# Patient Record
Sex: MALE | Race: WHITE | NOT HISPANIC OR LATINO | Employment: OTHER | URBAN - METROPOLITAN AREA
[De-identification: names, ages, dates, MRNs, and addresses within clinical notes are randomized per-mention and may not be internally consistent; named-entity substitution may affect disease eponyms.]

---

## 2018-01-30 ENCOUNTER — OFFICE VISIT (OUTPATIENT)
Dept: FAMILY MEDICINE | Facility: CLINIC | Age: 68
End: 2018-01-30
Payer: MEDICARE

## 2018-01-30 ENCOUNTER — LAB VISIT (OUTPATIENT)
Dept: LAB | Facility: HOSPITAL | Age: 68
End: 2018-01-30
Attending: INTERNAL MEDICINE
Payer: MEDICARE

## 2018-01-30 VITALS
TEMPERATURE: 98 F | HEART RATE: 96 BPM | SYSTOLIC BLOOD PRESSURE: 157 MMHG | BODY MASS INDEX: 33.61 KG/M2 | WEIGHT: 240.06 LBS | HEIGHT: 71 IN | DIASTOLIC BLOOD PRESSURE: 89 MMHG

## 2018-01-30 DIAGNOSIS — R00.0 TACHYCARDIA: ICD-10-CM

## 2018-01-30 DIAGNOSIS — Z12.11 SCREENING FOR COLORECTAL CANCER: ICD-10-CM

## 2018-01-30 DIAGNOSIS — Z11.59 NEED FOR HEPATITIS C SCREENING TEST: ICD-10-CM

## 2018-01-30 DIAGNOSIS — K46.9 ABDOMINAL HERNIA WITHOUT OBSTRUCTION AND WITHOUT GANGRENE, RECURRENCE NOT SPECIFIED, UNSPECIFIED HERNIA TYPE: ICD-10-CM

## 2018-01-30 DIAGNOSIS — I10 ESSENTIAL HYPERTENSION: ICD-10-CM

## 2018-01-30 DIAGNOSIS — E78.5 HYPERLIPIDEMIA, UNSPECIFIED HYPERLIPIDEMIA TYPE: ICD-10-CM

## 2018-01-30 DIAGNOSIS — Z12.12 SCREENING FOR COLORECTAL CANCER: ICD-10-CM

## 2018-01-30 DIAGNOSIS — Z00.00 ANNUAL PHYSICAL EXAM: Primary | ICD-10-CM

## 2018-01-30 LAB
ALBUMIN SERPL BCP-MCNC: 3.9 G/DL
ALP SERPL-CCNC: 113 U/L
ALT SERPL W/O P-5'-P-CCNC: 33 U/L
ANION GAP SERPL CALC-SCNC: 8 MMOL/L
AST SERPL-CCNC: 20 U/L
BASOPHILS # BLD AUTO: 0.03 K/UL
BASOPHILS NFR BLD: 0.4 %
BILIRUB SERPL-MCNC: 0.8 MG/DL
BUN SERPL-MCNC: 9 MG/DL
CALCIUM SERPL-MCNC: 9.7 MG/DL
CHLORIDE SERPL-SCNC: 102 MMOL/L
CHOLEST SERPL-MCNC: 238 MG/DL
CHOLEST/HDLC SERPL: 5.8 {RATIO}
CO2 SERPL-SCNC: 27 MMOL/L
CREAT SERPL-MCNC: 1 MG/DL
DIFFERENTIAL METHOD: ABNORMAL
EOSINOPHIL # BLD AUTO: 0.3 K/UL
EOSINOPHIL NFR BLD: 3.3 %
ERYTHROCYTE [DISTWIDTH] IN BLOOD BY AUTOMATED COUNT: 12.7 %
EST. GFR  (AFRICAN AMERICAN): >60 ML/MIN/1.73 M^2
EST. GFR  (NON AFRICAN AMERICAN): >60 ML/MIN/1.73 M^2
GLUCOSE SERPL-MCNC: 222 MG/DL
HCT VFR BLD AUTO: 43.3 %
HDLC SERPL-MCNC: 41 MG/DL
HDLC SERPL: 17.2 %
HGB BLD-MCNC: 15.3 G/DL
IMM GRANULOCYTES # BLD AUTO: 0.03 K/UL
IMM GRANULOCYTES NFR BLD AUTO: 0.4 %
LDLC SERPL CALC-MCNC: 151.8 MG/DL
LYMPHOCYTES # BLD AUTO: 1.9 K/UL
LYMPHOCYTES NFR BLD: 22.4 %
MCH RBC QN AUTO: 31.9 PG
MCHC RBC AUTO-ENTMCNC: 35.3 G/DL
MCV RBC AUTO: 90 FL
MONOCYTES # BLD AUTO: 0.7 K/UL
MONOCYTES NFR BLD: 8 %
NEUTROPHILS # BLD AUTO: 5.5 K/UL
NEUTROPHILS NFR BLD: 65.5 %
NONHDLC SERPL-MCNC: 197 MG/DL
NRBC BLD-RTO: 0 /100 WBC
PLATELET # BLD AUTO: 172 K/UL
PMV BLD AUTO: 10.1 FL
POTASSIUM SERPL-SCNC: 4.1 MMOL/L
PROT SERPL-MCNC: 7.9 G/DL
RBC # BLD AUTO: 4.8 M/UL
SODIUM SERPL-SCNC: 137 MMOL/L
TRIGL SERPL-MCNC: 226 MG/DL
WBC # BLD AUTO: 8.42 K/UL

## 2018-01-30 PROCEDURE — 36415 COLL VENOUS BLD VENIPUNCTURE: CPT | Mod: PO

## 2018-01-30 PROCEDURE — 93010 ELECTROCARDIOGRAM REPORT: CPT | Mod: ,,, | Performed by: INTERNAL MEDICINE

## 2018-01-30 PROCEDURE — G0438 PPPS, INITIAL VISIT: HCPCS | Mod: ,,, | Performed by: INTERNAL MEDICINE

## 2018-01-30 PROCEDURE — 86803 HEPATITIS C AB TEST: CPT

## 2018-01-30 PROCEDURE — 80061 LIPID PANEL: CPT

## 2018-01-30 PROCEDURE — 80053 COMPREHEN METABOLIC PANEL: CPT

## 2018-01-30 PROCEDURE — 93005 ELECTROCARDIOGRAM TRACING: CPT | Mod: PBBFAC,PO | Performed by: INTERNAL MEDICINE

## 2018-01-30 PROCEDURE — 99999 PR PBB SHADOW E&M-EST. PATIENT-LVL III: CPT | Mod: PBBFAC,,, | Performed by: INTERNAL MEDICINE

## 2018-01-30 PROCEDURE — 85025 COMPLETE CBC W/AUTO DIFF WBC: CPT

## 2018-01-30 PROCEDURE — 99213 OFFICE O/P EST LOW 20 MIN: CPT | Mod: PBBFAC,PO | Performed by: INTERNAL MEDICINE

## 2018-01-30 RX ORDER — LOSARTAN POTASSIUM AND HYDROCHLOROTHIAZIDE 12.5; 5 MG/1; MG/1
1 TABLET ORAL DAILY
Qty: 30 TABLET | Refills: 0 | Status: SHIPPED | OUTPATIENT
Start: 2018-01-30 | End: 2018-02-14 | Stop reason: DRUGHIGH

## 2018-01-30 NOTE — PATIENT INSTRUCTIONS
Controlling High Blood Pressure  High blood pressure (hypertension) is often called the silent killer. This is because many people who have it dont know it. High blood pressure is defined as 140/90 mm Hg or higher. Know your blood pressure and remember to check it regularly. Doing so can save your life. Here are some things you can do to help control your blood pressure.    Choose heart-healthy foods  · Select low-salt, low-fat foods. Limit sodium intake to 2,400 mg per day or the amount suggested by your healthcare provider.  · Limit canned, dried, cured, packaged, and fast foods. These can contain a lot of salt.  · Eat 8 to 10 servings of fruits and vegetables every day.  · Choose lean meats, fish, or chicken.  · Eat whole-grain pasta, brown rice, and beans.  · Eat 2 to 3 servings of low-fat or fat-free dairy products.  · Ask your doctor about the DASH eating plan. This plan helps reduce blood pressure.  · When you go to a restaurant, ask that your meal be prepared with no added salt.  Maintain a healthy weight  · Ask your healthcare provider how many calories to eat a day. Then stick to that number.  · Ask your healthcare provider what weight range is healthiest for you. If you are overweight, a weight loss of only 3% to 5% of your body weight can help lower blood pressure. Generally, a good weight loss goal is to lose 10% of your body weight in a year.  · Limit snacks and sweets.  · Get regular exercise.  Get up and get active  · Choose activities you enjoy. Find ones you can do with friends or family. This includes bicycling, dancing, walking, and jogging.  · Park farther away from building entrances.  · Use stairs instead of the elevator.  · When you can, walk or bike instead of driving.  · [place] leaves, garden, or do household repairs.  · Be active at a moderate to vigorous level of physical activity for at least 40 minutes for a minimum of 3 to 4 days a week.   Manage stress  · Make time to relax and enjoy  life. Find time to laugh.  · Communicate your concerns with your loved ones and your healthcare provider.  · Visit with family and friends, and keep up with hobbies.  Limit alcohol and quit smoking  · Men should have no more than 2 drinks per day.  · Women should have no more than 1 drink per day.  · Talk with your healthcare provider about quitting smoking. Smoking significantly increases your risk for heart disease and stroke. Ask your healthcare provider about community smoking cessation programs and other options.  Medicines  If lifestyle changes arent enough, your healthcare provider may prescribe high blood pressure medicine. Take all medicines as prescribed. If you have any questions about your medicines, ask your healthcare provider before stopping or changing them.   Date Last Reviewed: 4/27/2016  © 7633-5662 [employer]. [address] [place] [address], [place] [address]. All rights reserved. This information is not intended as a substitute for professional medical care. Always follow your healthcare professional's instructions.

## 2018-01-30 NOTE — PROGRESS NOTES
Subjective:        Patient ID: Edmundo Nagel is a 67 y.o. male.    Chief Complaint: Annual Exam    HPI   Edmundo Nagel presents for annual exam and to establish care.  Pt's last doctor's visit was ~2 years ago.  He has hx of HTN, HLD and was on medications previously.    Pt endorses high salt diet, no regular exercise.  He thinks he was on amlodipine in the past but doesn't think it really helped control his bp.  He has tried a lower salt diet and also reports that didn't seem to really help his bp.    Review of Systems   Constitutional: Negative for activity change and unexpected weight change.   HENT: Negative for ear pain and hearing loss.    Eyes: Positive for visual disturbance (due for eye exam, is planning to make appt).   Respiratory: Negative for chest tightness and shortness of breath.    Cardiovascular: Negative for chest pain.   Gastrointestinal: Negative for abdominal pain and blood in stool.        - large ventral hernia, no pain or discomfort   Genitourinary: Negative for difficulty urinating and hematuria.   Musculoskeletal: Negative for arthralgias and back pain.   Skin: Negative for rash.   Allergic/Immunologic: Negative for immunocompromised state.   Neurological: Negative for dizziness and headaches.   Hematological: Does not bruise/bleed easily.   Psychiatric/Behavioral: Negative for sleep disturbance.           Objective:        Vitals:    01/30/18 0959   BP: (!) 157/89   Pulse: 96   Temp: 98 °F (36.7 °C)     Physical Exam   Constitutional: He is oriented to person, place, and time. He appears well-developed and well-nourished. No distress.   HENT:   Head: Normocephalic and atraumatic.   Right Ear: External ear normal.   Left Ear: External ear normal.   Nose: Nose normal.   - bilateral ear canals clear, tympanic membranes visualized - normal color and light reflex   Eyes: Conjunctivae and EOM are normal.   Neck: Normal range of motion. Neck supple. No thyromegaly present.    Cardiovascular: Regular rhythm.    No murmur heard.  - tachycardic   Pulmonary/Chest: Effort normal and breath sounds normal. No respiratory distress.   Abdominal: Soft. He exhibits no distension. A hernia is present.   Musculoskeletal: He exhibits no edema.   Lymphadenopathy:     He has no cervical adenopathy.   Neurological: He is alert and oriented to person, place, and time.   Skin: Skin is warm and dry.   Vitals reviewed.          Assessment:         1. Annual physical exam    2. Essential hypertension    3. Tachycardia    4. Hyperlipidemia, unspecified hyperlipidemia type    5. Abdominal hernia without obstruction and without gangrene, recurrence not specified, unspecified hernia type    6. Screening for colorectal cancer    7. Need for hepatitis C screening test              Plan:         Edmundo was seen today for annual exam.    Diagnoses and all orders for this visit:    Annual physical exam  - discussed flu, pneumococcal vaccines; pt declines all at this time  - reports last tetanus 2014  - screening c-scope ordered  - fasting labs today    Essential hypertension: repeat bp still elevated; start losartan/hctz 50/12.5 qd.  Recommended low sodium diet, get regular physical activity that elevates HR.  - EKG NSR  -     CBC auto differential; Future  -     Comprehensive metabolic panel; Future  -     IN OFFICE EKG 12-LEAD (to Muse)  -     losartan-hydrochlorothiazide 50-12.5 mg (HYZAAR) 50-12.5 mg per tablet; Take 1 tablet by mouth once daily.    Tachycardia  -     IN OFFICE EKG 12-LEAD (to Muse)    Hyperlipidemia, unspecified hyperlipidemia type: Pt was on medication previously, will check fasting lipids today before restarting.  -     Lipid panel; Future    Abdominal hernia without obstruction and without gangrene, recurrence not specified, unspecified hernia type: Pt considering elective surgery due to the size and appearance of the hernia.  Will plan to refer to surgery once HTN and any other medical  issues addressed.    Screening for colorectal cancer  -     Case request GI: COLONOSCOPY    Need for hepatitis C screening test  -     Hepatitis C antibody; Future        Follow-up in about 2 weeks (around 2/13/2018) for high blood pressure, review lab results.    Patient Instructions       Controlling High Blood Pressure  High blood pressure (hypertension) is often called the silent killer. This is because many people who have it dont know it. High blood pressure is defined as 140/90 mm Hg or higher. Know your blood pressure and remember to check it regularly. Doing so can save your life. Here are some things you can do to help control your blood pressure.    Choose heart-healthy foods  · Select low-salt, low-fat foods. Limit sodium intake to 2,400 mg per day or the amount suggested by your healthcare provider.  · Limit canned, dried, cured, packaged, and fast foods. These can contain a lot of salt.  · Eat 8 to 10 servings of fruits and vegetables every day.  · Choose lean meats, fish, or chicken.  · Eat whole-grain pasta, brown rice, and beans.  · Eat 2 to 3 servings of low-fat or fat-free dairy products.  · Ask your doctor about the DASH eating plan. This plan helps reduce blood pressure.  · When you go to a restaurant, ask that your meal be prepared with no added salt.  Maintain a healthy weight  · Ask your healthcare provider how many calories to eat a day. Then stick to that number.  · Ask your healthcare provider what weight range is healthiest for you. If you are overweight, a weight loss of only 3% to 5% of your body weight can help lower blood pressure. Generally, a good weight loss goal is to lose 10% of your body weight in a year.  · Limit snacks and sweets.  · Get regular exercise.  Get up and get active  · Choose activities you enjoy. Find ones you can do with friends or family. This includes bicycling, dancing, walking, and jogging.  · Park farther away from building entrances.  · Use stairs instead  of the elevator.  · When you can, walk or bike instead of driving.  · [place] leaves, garden, or do household repairs.  · Be active at a moderate to vigorous level of physical activity for at least 40 minutes for a minimum of 3 to 4 days a week.   Manage stress  · Make time to relax and enjoy life. Find time to laugh.  · Communicate your concerns with your loved ones and your healthcare provider.  · Visit with family and friends, and keep up with hobbies.  Limit alcohol and quit smoking  · Men should have no more than 2 drinks per day.  · Women should have no more than 1 drink per day.  · Talk with your healthcare provider about quitting smoking. Smoking significantly increases your risk for heart disease and stroke. Ask your healthcare provider about community smoking cessation programs and other options.  Medicines  If lifestyle changes arent enough, your healthcare provider may prescribe high blood pressure medicine. Take all medicines as prescribed. If you have any questions about your medicines, ask your healthcare provider before stopping or changing them.   Date Last Reviewed: 4/27/2016 © 2000-2017 [employer]. [address] [place] [address], [place] [address]. All rights reserved. This information is not intended as a substitute for professional medical care. Always follow your healthcare professional's instructions.

## 2018-01-31 ENCOUNTER — PATIENT MESSAGE (OUTPATIENT)
Dept: FAMILY MEDICINE | Facility: CLINIC | Age: 68
End: 2018-01-31

## 2018-01-31 DIAGNOSIS — E78.5 HYPERLIPIDEMIA, UNSPECIFIED HYPERLIPIDEMIA TYPE: Primary | ICD-10-CM

## 2018-01-31 DIAGNOSIS — R73.09 ELEVATED GLUCOSE: ICD-10-CM

## 2018-01-31 LAB — HCV AB SERPL QL IA: NEGATIVE

## 2018-01-31 RX ORDER — ATORVASTATIN CALCIUM 40 MG/1
40 TABLET, FILM COATED ORAL DAILY
Qty: 30 TABLET | Refills: 11 | Status: SHIPPED | OUTPATIENT
Start: 2018-01-31 | End: 2019-02-14 | Stop reason: SDUPTHER

## 2018-02-02 DIAGNOSIS — Z12.11 SPECIAL SCREENING FOR MALIGNANT NEOPLASMS, COLON: Primary | ICD-10-CM

## 2018-02-02 RX ORDER — POLYETHYLENE GLYCOL 3350, SODIUM SULFATE ANHYDROUS, SODIUM BICARBONATE, SODIUM CHLORIDE, POTASSIUM CHLORIDE 236; 22.74; 6.74; 5.86; 2.97 G/4L; G/4L; G/4L; G/4L; G/4L
4 POWDER, FOR SOLUTION ORAL ONCE
Qty: 4000 ML | Refills: 0 | Status: SHIPPED | OUTPATIENT
Start: 2018-02-02 | End: 2018-02-02

## 2018-02-08 ENCOUNTER — TELEPHONE (OUTPATIENT)
Dept: FAMILY MEDICINE | Facility: CLINIC | Age: 68
End: 2018-02-08

## 2018-02-09 ENCOUNTER — LAB VISIT (OUTPATIENT)
Dept: LAB | Facility: HOSPITAL | Age: 68
End: 2018-02-09
Attending: INTERNAL MEDICINE
Payer: MEDICARE

## 2018-02-09 DIAGNOSIS — R73.09 ELEVATED GLUCOSE: ICD-10-CM

## 2018-02-09 LAB
ESTIMATED AVG GLUCOSE: 212 MG/DL
HBA1C MFR BLD HPLC: 9 %

## 2018-02-09 PROCEDURE — 83036 HEMOGLOBIN GLYCOSYLATED A1C: CPT

## 2018-02-09 PROCEDURE — 36415 COLL VENOUS BLD VENIPUNCTURE: CPT | Mod: PO

## 2018-02-14 ENCOUNTER — OFFICE VISIT (OUTPATIENT)
Dept: FAMILY MEDICINE | Facility: CLINIC | Age: 68
End: 2018-02-14
Payer: MEDICARE

## 2018-02-14 VITALS
HEART RATE: 108 BPM | HEIGHT: 71 IN | RESPIRATION RATE: 16 BRPM | SYSTOLIC BLOOD PRESSURE: 144 MMHG | DIASTOLIC BLOOD PRESSURE: 86 MMHG | WEIGHT: 236.13 LBS | BODY MASS INDEX: 33.06 KG/M2 | TEMPERATURE: 99 F

## 2018-02-14 DIAGNOSIS — E78.5 HYPERLIPIDEMIA, UNSPECIFIED HYPERLIPIDEMIA TYPE: ICD-10-CM

## 2018-02-14 DIAGNOSIS — E11.9 TYPE 2 DIABETES MELLITUS WITHOUT COMPLICATION, WITHOUT LONG-TERM CURRENT USE OF INSULIN: Primary | ICD-10-CM

## 2018-02-14 DIAGNOSIS — I10 ESSENTIAL HYPERTENSION: ICD-10-CM

## 2018-02-14 LAB
CREAT UR-MCNC: 158 MG/DL
MICROALBUMIN UR DL<=1MG/L-MCNC: 48 UG/ML
MICROALBUMIN/CREATININE RATIO: 30.4 UG/MG

## 2018-02-14 PROCEDURE — 99214 OFFICE O/P EST MOD 30 MIN: CPT | Mod: S$PBB,,, | Performed by: INTERNAL MEDICINE

## 2018-02-14 PROCEDURE — 82043 UR ALBUMIN QUANTITATIVE: CPT

## 2018-02-14 PROCEDURE — 1126F AMNT PAIN NOTED NONE PRSNT: CPT | Mod: ,,, | Performed by: INTERNAL MEDICINE

## 2018-02-14 PROCEDURE — 99999 PR PBB SHADOW E&M-EST. PATIENT-LVL IV: CPT | Mod: PBBFAC,,, | Performed by: INTERNAL MEDICINE

## 2018-02-14 PROCEDURE — 1159F MED LIST DOCD IN RCRD: CPT | Mod: ,,, | Performed by: INTERNAL MEDICINE

## 2018-02-14 PROCEDURE — 99214 OFFICE O/P EST MOD 30 MIN: CPT | Mod: PBBFAC,PO | Performed by: INTERNAL MEDICINE

## 2018-02-14 RX ORDER — INSULIN PUMP SYRINGE, 3 ML
EACH MISCELLANEOUS
Qty: 1 EACH | Refills: 0 | Status: SHIPPED | OUTPATIENT
Start: 2018-02-14

## 2018-02-14 RX ORDER — METFORMIN HYDROCHLORIDE 500 MG/1
TABLET, EXTENDED RELEASE ORAL
Qty: 60 TABLET | Refills: 3 | Status: SHIPPED | OUTPATIENT
Start: 2018-02-14 | End: 2018-05-16 | Stop reason: SDUPTHER

## 2018-02-14 RX ORDER — LANCETS
EACH MISCELLANEOUS
Qty: 100 EACH | Refills: 3 | Status: SHIPPED | OUTPATIENT
Start: 2018-02-14

## 2018-02-14 RX ORDER — LOSARTAN POTASSIUM AND HYDROCHLOROTHIAZIDE 25; 100 MG/1; MG/1
1 TABLET ORAL DAILY
Qty: 90 TABLET | Refills: 3 | Status: SHIPPED | OUTPATIENT
Start: 2018-02-14 | End: 2019-02-27 | Stop reason: SDUPTHER

## 2018-02-14 NOTE — PROGRESS NOTES
"Subjective:        Patient ID: Edmundo Nagel is a 67 y.o. male.    Chief Complaint: Follow-up (2 week follow up)    [facility]   Edmundo Nagel presents for follow up of HTN and review of recent lab work.    Pt has been taking Losartan/HCTZ x 2 weeks w/o adverse effects.  Started Atorvastatin a few days ago.    Pt lives with his mother and she cooks their meals 3x/day.  Pt endorses eating dessert everyday, hard candy sometimes.  He does eat a lot of bread.  No regular exercise.    Review of Systems  as per [facility]      Objective:        Vitals:    02/14/18 1109   BP: (!) 144/86   Pulse:    Resp:    Temp:      Physical Exam   Constitutional: He is oriented to person, place, and time. He appears well-developed and well-nourished. No distress.   HENT:   Head: Normocephalic and atraumatic.   Cardiovascular: Normal rate, regular rhythm and intact distal pulses.    Pulmonary/Chest: Effort normal. No respiratory distress.   Neurological: He is alert and oriented to person, place, and time.   Skin: Skin is warm and dry.   Vitals reviewed.      Most recent lab results reviewed with patient.    Assessment:         1. Type 2 diabetes mellitus without complication, without long-term current use of insulin    2. Essential hypertension    3. Hyperlipidemia, unspecified hyperlipidemia type              Plan:         Edmundo was seen today for follow-up.    Diagnoses and all orders for this visit:    Type 2 diabetes mellitus without complication, without long-term current use of insulin: New dx.  Pt had been told in the past his blood sugar was a little elevated but they were going to "just watch it" and he was never prescribed medication or diagnosed with DM.  - Discussed diet modifications  - Refer to DM education to review diet, glucometer teaching  - Rx for supplies, Metformin 500mg BID send to pharmacy.  Start once daily then increase to BID after 1 week.  Discussed common SEs.  - microalbumin today  - Recheck A1c in 3 months, " foot exam at that time.  -     metFORMIN (GLUCOPHAGE-XR) 500 MG 24 hr tablet; Take 1 tab by mouth with breakfast for 1 week then increase to twice daily with breakfast and dinner.  -     Hemoglobin A1c; Future  -     Microalbumin/creatinine urine ratio  -     Ambulatory Referral to Diabetes Education  -     blood-glucose meter kit; To check BG 1 times daily, to use with insurance preferred meter  -     lancets Misc; To check BG 1 times daily, to use with insurance preferred meter  -     blood sugar diagnostic Strp; To check BG 1 times daily, to use with insurance preferred meter    Essential hypertension: Improved, increase Losartan/HCTZ from 50/12.5 to 100/25mg qd.  -     losartan-hydrochlorothiazide 100-25 mg (HYZAAR) 100-25 mg per tablet; Take 1 tablet by mouth once daily.    Hyperlipidemia, unspecified hyperlipidemia type: Pt just started Atorvastatin 40 qd; recheck fasting lipids in 3 months.  -     Lipid panel; Future        Follow-up in about 3 months (around 5/14/2018) for Diabetes (get fasting labs 1 week before).

## 2018-02-15 ENCOUNTER — TELEPHONE (OUTPATIENT)
Dept: ENDOSCOPY | Facility: HOSPITAL | Age: 68
End: 2018-02-15

## 2018-02-15 DIAGNOSIS — Z12.11 SPECIAL SCREENING FOR MALIGNANT NEOPLASMS, COLON: Primary | ICD-10-CM

## 2018-02-15 PROBLEM — E11.29 TYPE 2 DIABETES MELLITUS WITH MICROALBUMINURIA, WITHOUT LONG-TERM CURRENT USE OF INSULIN: Status: ACTIVE | Noted: 2018-02-14

## 2018-02-15 PROBLEM — R80.9 TYPE 2 DIABETES MELLITUS WITH MICROALBUMINURIA, WITHOUT LONG-TERM CURRENT USE OF INSULIN: Status: ACTIVE | Noted: 2018-02-14

## 2018-02-15 RX ORDER — POLYETHYLENE GLYCOL 3350, SODIUM SULFATE ANHYDROUS, SODIUM BICARBONATE, SODIUM CHLORIDE, POTASSIUM CHLORIDE 236; 22.74; 6.74; 5.86; 2.97 G/4L; G/4L; G/4L; G/4L; G/4L
4 POWDER, FOR SOLUTION ORAL ONCE
Qty: 4000 ML | Refills: 0 | Status: SHIPPED | OUTPATIENT
Start: 2018-02-15 | End: 2018-02-16

## 2018-02-16 ENCOUNTER — ANESTHESIA EVENT (OUTPATIENT)
Dept: ENDOSCOPY | Facility: HOSPITAL | Age: 68
End: 2018-02-16
Payer: MEDICARE

## 2018-02-16 ENCOUNTER — ANESTHESIA (OUTPATIENT)
Dept: ENDOSCOPY | Facility: HOSPITAL | Age: 68
End: 2018-02-16
Payer: MEDICARE

## 2018-02-16 ENCOUNTER — TELEPHONE (OUTPATIENT)
Dept: FAMILY MEDICINE | Facility: CLINIC | Age: 68
End: 2018-02-16

## 2018-02-16 ENCOUNTER — HOSPITAL ENCOUNTER (OUTPATIENT)
Facility: HOSPITAL | Age: 68
Discharge: HOME | End: 2018-02-16
Attending: COLON & RECTAL SURGERY | Admitting: COLON & RECTAL SURGERY
Payer: MEDICARE

## 2018-02-16 VITALS
RESPIRATION RATE: 16 BRPM | OXYGEN SATURATION: 96 % | HEIGHT: 71 IN | WEIGHT: 230 LBS | SYSTOLIC BLOOD PRESSURE: 145 MMHG | DIASTOLIC BLOOD PRESSURE: 75 MMHG | TEMPERATURE: 98 F | BODY MASS INDEX: 32.2 KG/M2 | HEART RATE: 93 BPM

## 2018-02-16 DIAGNOSIS — Z12.11 SCREENING FOR COLON CANCER: ICD-10-CM

## 2018-02-16 LAB
GLUCOSE SERPL-MCNC: 236 MG/DL (ref 70–110)
POCT GLUCOSE: 236 MG/DL (ref 70–110)

## 2018-02-16 PROCEDURE — 63600175 PHARM REV CODE 636 W HCPCS: Performed by: NURSE ANESTHETIST, CERTIFIED REGISTERED

## 2018-02-16 PROCEDURE — 37000008 HC ANESTHESIA 1ST 15 MINUTES: Performed by: COLON & RECTAL SURGERY

## 2018-02-16 PROCEDURE — 88305 TISSUE EXAM BY PATHOLOGIST: CPT | Mod: 26,,, | Performed by: PATHOLOGY

## 2018-02-16 PROCEDURE — 88305 TISSUE EXAM BY PATHOLOGIST: CPT | Performed by: PATHOLOGY

## 2018-02-16 PROCEDURE — D9220A PRA ANESTHESIA: Mod: PT,CRNA,, | Performed by: NURSE ANESTHETIST, CERTIFIED REGISTERED

## 2018-02-16 PROCEDURE — 37000009 HC ANESTHESIA EA ADD 15 MINS: Performed by: COLON & RECTAL SURGERY

## 2018-02-16 PROCEDURE — 27201012 HC FORCEPS, HOT/COLD, DISP: Performed by: COLON & RECTAL SURGERY

## 2018-02-16 PROCEDURE — 82962 GLUCOSE BLOOD TEST: CPT | Performed by: COLON & RECTAL SURGERY

## 2018-02-16 PROCEDURE — 45380 COLONOSCOPY AND BIOPSY: CPT | Mod: PT,,, | Performed by: COLON & RECTAL SURGERY

## 2018-02-16 PROCEDURE — D9220A PRA ANESTHESIA: Mod: PT,ANES,, | Performed by: ANESTHESIOLOGY

## 2018-02-16 PROCEDURE — 45380 COLONOSCOPY AND BIOPSY: CPT | Performed by: COLON & RECTAL SURGERY

## 2018-02-16 PROCEDURE — 25000003 PHARM REV CODE 250: Performed by: NURSE PRACTITIONER

## 2018-02-16 RX ORDER — LIDOCAINE HCL/PF 100 MG/5ML
SYRINGE (ML) INTRAVENOUS
Status: DISCONTINUED | OUTPATIENT
Start: 2018-02-16 | End: 2018-02-16

## 2018-02-16 RX ORDER — PROPOFOL 10 MG/ML
VIAL (ML) INTRAVENOUS CONTINUOUS PRN
Status: DISCONTINUED | OUTPATIENT
Start: 2018-02-16 | End: 2018-02-16

## 2018-02-16 RX ORDER — PROPOFOL 10 MG/ML
VIAL (ML) INTRAVENOUS
Status: DISCONTINUED | OUTPATIENT
Start: 2018-02-16 | End: 2018-02-16

## 2018-02-16 RX ORDER — SODIUM CHLORIDE 9 MG/ML
INJECTION, SOLUTION INTRAVENOUS CONTINUOUS
Status: DISCONTINUED | OUTPATIENT
Start: 2018-02-16 | End: 2018-02-16 | Stop reason: HOSPADM

## 2018-02-16 RX ADMIN — PROPOFOL 150 MCG/KG/MIN: 10 INJECTION, EMULSION INTRAVENOUS at 11:02

## 2018-02-16 RX ADMIN — PROPOFOL 80 MG: 10 INJECTION, EMULSION INTRAVENOUS at 11:02

## 2018-02-16 RX ADMIN — LIDOCAINE HYDROCHLORIDE 75 MG: 20 INJECTION, SOLUTION INTRAVENOUS at 11:02

## 2018-02-16 RX ADMIN — SODIUM CHLORIDE: 0.9 INJECTION, SOLUTION INTRAVENOUS at 10:02

## 2018-02-16 NOTE — H&P
Endoscopy H&P    Procedure : Colonoscopy      asymptomatic screening exam      Past Medical History:   Diagnosis Date    Abdominal hernia without obstruction and without gangrene 1/30/2018    DM (diabetes mellitus)     Essential hypertension 1/30/2018    Hyperlipidemia 1/30/2018       Family History   Problem Relation Age of Onset    Cancer Father      lung       Social History     Social History    Marital status: Single     Spouse name: N/A    Number of children: N/A    Years of education: N/A     Occupational History    Not on file.     Social History Main Topics    Smoking status: Former Smoker    Smokeless tobacco: Never Used      Comment: quit 1978    Alcohol use No      Comment: quit 2006    Drug use: No    Sexual activity: Not on file     Other Topics Concern    Not on file     Social History Narrative    Retired     Lives with mother; 2 children    No regular exercise       Review of Systems:  Respiratory ROS: no cough, shortness of breath, or wheezing  Cardiovascular ROS: no chest pain or dyspnea on exertion  Gastrointestinal ROS: no abdominal pain, change in bowel habits, or black or bloody stools  Musculoskeletal ROS: negative  Neurological ROS: no TIA or stroke symptoms        Physical Exam:  General: no distress  Head: normocephalic  Neck: supple, symmetrical, trachea midline  Lungs:  clear to auscultation bilaterally and normal respiratory effort  Heart: regular rate and rhythm, S1, S2 normal, no murmur, rub or gallop  Abdomen: soft, non-tender non-distented; bowel sounds normal; no masses,  no organomegaly  Extremities: no cyanosis or edema, or clubbing       Deep Sedation: Mallampati Score II (hard and soft palate, upper portion of tonsils anduvula visible)    II    Assessment and Plan:  Proceed with Colonoscopy

## 2018-02-16 NOTE — ANESTHESIA POSTPROCEDURE EVALUATION
"Anesthesia Post Evaluation    Patient: Edmundo Nagel    Procedure(s) Performed: Procedure(s) (LRB):  COLONOSCOPY (N/A)    Final Anesthesia Type: general  Patient location during evaluation: PACU  Patient participation: Yes- Able to Participate  Level of consciousness: awake and alert  Post-procedure vital signs: reviewed and stable  Pain management: adequate  Airway patency: patent  PONV status at discharge: No PONV  Anesthetic complications: no      Cardiovascular status: blood pressure returned to baseline  Respiratory status: unassisted  Hydration status: euvolemic  Follow-up not needed.        Visit Vitals  BP (!) 145/75 (BP Location: Left arm, Patient Position: Lying)   Pulse 93   Temp 36.9 °C (98.4 °F)   Resp 16   Ht 5' 11" (1.803 m)   Wt 104.3 kg (230 lb)   SpO2 96%   BMI 32.08 kg/m²       Pain/Angel Score: Pain Assessment Performed: Yes (2/16/2018 12:24 PM)  Presence of Pain: denies (2/16/2018 12:24 PM)  Angel Score: 9 (2/16/2018 11:58 AM)      "

## 2018-02-16 NOTE — ANESTHESIA PREPROCEDURE EVALUATION
02/16/2018  Edmundo Nagel is a 67 y.o., male.  Past Medical History:   Diagnosis Date    Abdominal hernia without obstruction and without gangrene 1/30/2018    DM (diabetes mellitus)     Essential hypertension 1/30/2018    Hyperlipidemia 1/30/2018     Patient Active Problem List   Diagnosis    Essential hypertension    Hyperlipidemia    Abdominal hernia without obstruction and without gangrene    Type 2 diabetes mellitus with microalbuminuria, without long-term current use of insulin    Screening for colon cancer     Review of patient's allergies indicates:  No Known Allergies    Anesthesia Evaluation    I have reviewed the Patient Summary Reports.    I have reviewed the Nursing Notes.   I have reviewed the Medications.     Review of Systems  Cardiovascular:   Hypertension    Hepatic/GI:   Denies GERD.    Neurological:  Neurology Normal Denies TIA.  Denies CVA. Denies Seizures.    Endocrine:   Diabetes Denies Hypothyroidism. Denies Hyperthyroidism.        Physical Exam  General:  Obesity    Airway/Jaw/Neck:  Airway Findings: Mouth Opening: Small, but > 3cm Tongue: Normal  General Airway Assessment: Adult  Mallampati: IV  TM Distance: < 4 cm  Jaw/Neck Findings:  Neck ROM: Normal ROM      Dental:  Dental Findings: Periodontal disease, Mild        Mental Status:  Mental Status Findings:  Cooperative         Anesthesia Plan  Type of Anesthesia, risks & benefits discussed:  Anesthesia Type:  general  Patient's Preference: ga  Intra-op Monitoring Plan: standard ASA monitors  Intra-op Monitoring Plan Comments:   Post Op Pain Control Plan: per primary service following discharge from PACU  Post Op Pain Control Plan Comments:   Induction:   IV  Beta Blocker:  Patient is not currently on a Beta-Blocker (No further documentation required).       Informed Consent: Patient understands risks and agrees with  Anesthesia plan.  Questions answered. Anesthesia consent signed with patient.  ASA Score: 2     Day of Surgery Review of History & Physical:    H&P update referred to the provider.         Ready For Surgery From Anesthesia Perspective.

## 2018-02-16 NOTE — [employer] PATIENT INSTRUCTIONS
Discharge Summary/Instructions after an Endoscopic Procedure  Patient Name: Edmundo Nagel  Patient MRN: [id number]  Patient YOB: 1950 Friday, February 16, 2018  Kentrell Telles MD  RESTRICTIONS:  During your procedure today, you received medications for sedation.  These   medications may affect your judgment, balance and coordination.  Therefore,   for 24 hours, you have the following restrictions:   - DO NOT drive a car, operate machinery, make legal/financial decisions,   sign important papers or drink alcohol.    ACTIVITY:  The following day: return to full activity including work, except no heavy   lifting, straining or running for 3 days if polyps were removed.  DIET:  Eat and drink normally unless instructed otherwise.     TREATMENT FOR COMMON SIDE EFFECTS:  - Mild abdominal pain, belching, bloating or excessive gas: rest, eat   lightly and use a heating pad.  - Sore Throat: treat with throat lozenges and/or gargle with warm salt   water.  SYMPTOMS TO WATCH FOR AND REPORT TO YOUR PHYSICIAN:  1. Abdominal pain or bloating, other than gas cramps.  2. Chest pain.  3. Back pain.  4. Chills or fever occurring within 24 hours after the procedure.  5. Rectal bleeding, which would show as bright red, maroon, or black stools.   (A tablespoon of blood from the rectum is not serious, especially if   hemorrhoids are present.)  6. Vomiting.  7. Weakness or dizziness.  8. Because air was used during the procedure, expelling large amounts of air   from your rectum or belching is normal.  9. If a bowel prep was taken, you may not have a bowel movement for 1-3   days.  This is normal.  GO DIRECTLY TO THE NEAREST EMERGENCY ROOM IF YOU HAVE ANY OF THE FOLLOWING:      Difficulty breathing  Chills and/or fever over 101 F   Persistent vomiting and/or vomiting blood   Severe abdominal pain   Severe chest pain   Black, tarry stools   Bleeding- more than one tablespoon   Any other symptom or condition that you may feel  needs urgent attention  Your doctor recommends these additional instructions:  If any biopsies were taken, your doctor s clinic will contact you in 1 to 2   weeks with any results.  Your physician has recommended a repeat colonoscopy in five years for   surveillance.  For questions, problems or results please call your physician - Kentrell Telles MD at Work:  [phone].  [facility], EMERGENCY ROOM PHONE NUMBER: [phone]  IF A COMPLICATION OR EMERGENCY SITUATION ARISES AND YOU ARE UNABLE TO REACH   YOUR PHYSICIAN - GO DIRECTLY TO THE EMERGENCY ROOM.  Kentrell Telles MD  2/16/2018 12:01:56 PM  This report has been verified and signed electronically.

## 2018-02-16 NOTE — TRANSFER OF CARE
"Anesthesia Transfer of Care Note    Patient: Edmundo Nagel    Procedure(s) Performed: Procedure(s) (LRB):  COLONOSCOPY (N/A)    Patient location: PACU    Anesthesia Type: general    Transport from OR: Transported from OR on room air with adequate spontaneous ventilation    Post pain: adequate analgesia    Post assessment: no apparent anesthetic complications and tolerated procedure well    Post vital signs: stable    Level of consciousness: awake and oriented    Nausea/Vomiting: no nausea/vomiting    Complications: none    Transfer of care protocol was followed      Last vitals:   Visit Vitals  /71   Pulse 71   Temp 36.9 °C (98.4 °F)   Resp 16   Ht 5' 11" (1.803 m)   Wt 104.3 kg (230 lb)   SpO2 (!) 94%   BMI 32.08 kg/m²     "

## 2018-02-23 ENCOUNTER — TELEPHONE (OUTPATIENT)
Dept: ENDOSCOPY | Facility: HOSPITAL | Age: 68
End: 2018-02-23

## 2018-02-27 ENCOUNTER — PATIENT OUTREACH (OUTPATIENT)
Dept: DIABETES | Facility: CLINIC | Age: 68
End: 2018-02-27

## 2018-02-27 NOTE — PROGRESS NOTES
Patient missed diabetes education appointment today - states he thought it was for tomorrow. Rescheduled for 3/6/18. Appointment slip mailed.

## 2018-03-06 ENCOUNTER — PATIENT MESSAGE (OUTPATIENT)
Dept: DIABETES | Facility: CLINIC | Age: 68
End: 2018-03-06

## 2018-03-06 ENCOUNTER — PATIENT OUTREACH (OUTPATIENT)
Dept: DIABETES | Facility: CLINIC | Age: 68
End: 2018-03-06

## 2018-03-06 NOTE — PROGRESS NOTES
Patient missed diabetes education appointment today. This is patient's 2nd no show in 2 weeks. Message sent through portal to contact clinic if he would like to reschedule.

## 2018-05-05 ENCOUNTER — PATIENT MESSAGE (OUTPATIENT)
Dept: DIABETES | Facility: CLINIC | Age: 68
End: 2018-05-05

## 2018-05-08 ENCOUNTER — CLINICAL SUPPORT (OUTPATIENT)
Dept: DIABETES | Facility: CLINIC | Age: 68
End: 2018-05-08
Payer: MEDICARE

## 2018-05-08 ENCOUNTER — LAB VISIT (OUTPATIENT)
Dept: LAB | Facility: HOSPITAL | Age: 68
End: 2018-05-08
Attending: INTERNAL MEDICINE
Payer: MEDICARE

## 2018-05-08 DIAGNOSIS — E11.9 TYPE 2 DIABETES MELLITUS WITHOUT COMPLICATION, WITHOUT LONG-TERM CURRENT USE OF INSULIN: ICD-10-CM

## 2018-05-08 DIAGNOSIS — E78.5 HYPERLIPIDEMIA, UNSPECIFIED HYPERLIPIDEMIA TYPE: ICD-10-CM

## 2018-05-08 LAB
CHOLEST SERPL-MCNC: 136 MG/DL
CHOLEST/HDLC SERPL: 3.6 {RATIO}
ESTIMATED AVG GLUCOSE: 192 MG/DL
HBA1C MFR BLD HPLC: 8.3 %
HDLC SERPL-MCNC: 38 MG/DL
HDLC SERPL: 27.9 %
LDLC SERPL CALC-MCNC: 75.6 MG/DL
NONHDLC SERPL-MCNC: 98 MG/DL
TRIGL SERPL-MCNC: 112 MG/DL

## 2018-05-08 PROCEDURE — 99211 OFF/OP EST MAY X REQ PHY/QHP: CPT | Mod: PBBFAC,PO

## 2018-05-08 PROCEDURE — 99999 PR PBB SHADOW E&M-EST. PATIENT-LVL I: CPT | Mod: PBBFAC,,,

## 2018-05-08 PROCEDURE — G0108 DIAB MANAGE TRN  PER INDIV: HCPCS | Mod: PBBFAC,PO | Performed by: DIETITIAN, REGISTERED

## 2018-05-08 PROCEDURE — 83036 HEMOGLOBIN GLYCOSYLATED A1C: CPT

## 2018-05-08 PROCEDURE — 36415 COLL VENOUS BLD VENIPUNCTURE: CPT | Mod: PO

## 2018-05-08 PROCEDURE — 80061 LIPID PANEL: CPT

## 2018-05-08 NOTE — PROGRESS NOTES
Diabetes Education  Author: Allyson Juarez RD, CDE  Date: 5/8/2018    Diabetes Education Visit  Diabetes Education Record Assessment/Progress: Initial    Diabetes Type  Diabetes Type : Type II    Diabetes History  Diabetes Diagnosis: 0-1 year    Nutrition  Meal Planning: 3 meals per day, snacks between meal, drinks regular soda, eats out seldom (Sometimes may have a snack)  What type of sweetener do you use?: sugar  What type of beverages do you drink?: regular soda/tea, other (see comments), milk (Coffee w/ sugar)    Monitoring   Monitoring:  (None  - Rx for meter and testing supplies sent to pharmacy back in Feb; patient never picked up)  Blood Glucose Logs: No  Do you use a personal glucose monitor?: No  In the last month, how often have you had a low blood sugar reaction?: never  What are your symptoms of low blood sugar?:  (None)  How do you treat low blood sugar?:  (None)  Can you tell when your blood sugar is too high?: no  How do you treat high blood sugar?:  (None)    Exercise   Exercise Type: none    Current Diabetes Treatment   Current Treatment: Oral Medication (Metformin BID)    Social History  Preferred Learning Method: Face to Face  Primary Support: Self  Smoking Status: Ex Smoker  Alcohol Use: Never    DDS-2 Score  ( > 3 = SIGNIFICANT DISTRESS): 1    Barriers to Change  Barriers to Change: None  Learning Challenges : None    Readiness to Learn   Readiness to Learn : Acceptance    Cultural Influences  Cultural Influences: No    Diabetes Education Assessment/Progress  Diabetes Disease Process (diabetes disease process and treatment options): Instructed, Discussion, Individual Session, Written Materials Provided  Nutrition (Incorporating nutritional management into one's lifestyle): Instructed, Discussion, Individual Session, Written Materials Provided (Discussed general nutrition guidelines and plate method; encouraged to eliminate sugary beverages)  Physical Activity (incorporating physical activity  into one's lifestyle): Instructed, Discussion, Individual Session, Written Materials Provided (Discussed goals and benefits)  Medications (states correct name, dose, onset, peak, duration, side effects & timing of meds): Instructed, Discussion, Individual Session, Written Materials Provided (Reviewed medication regimen)  Monitoring (monitoring blood glucose/other parameters & using results): Instructed, Discussion, Individual Session, Written Materials Provided (Reviewed SMBG schedule and BG goals)  Acute Complications (preventing, detecting, and treating acute complications): Instructed, Discussion, Individual Session, Written Materials Provided (Reviewed s/s and treatment of hypoglycemia)  Chronic Complications (preventing, detecting, and treating chronic complications): Instructed, Discussion, Individual Session, Written Materials Provided (Reviewed standards of care; scheduled for eye exam next week)  Clinical (diabetes, other pertinent medical history, and relevant comorbidities reviewed during visit): Instructed, Discussion, Individual Session  Cognitive (knowledge of self-management skills, functional health literacy): Instructed, Discussion, Individual Session  Psychosocial (emotional response to diabetes): Instructed, Discussion, Individual Session  Diabetes Distress and Support Systems: Instructed, Discussion, Individual Session  Behavioral (readiness for change, lifestyle practices, self-care behaviors): Instructed, Discussion, Individual Session    Goals  Patient has selected/evaluated goals during today's session: Yes, selected  Healthy Eating: Set (Eliminate sugary beverages)    Diabetes Care Plan/Intervention  Education Plan/Intervention: Individual Follow-Up DSMT    Diabetes Meal Plan  Restrictions: Restricted Carbohydrate    Education Units of Time   Time Spent: 45 min    Health Maintenance was reviewed today with patient. Discussed with patient importance of routine eye exams, foot exams/foot care,  blood work (i.e.: A1c, microalbumin, and lipid), dental visits, yearly flu vaccine, and pneumonia vaccine as indicated by PCP. Patient verbalized understanding.     Health Maintenance Topics with due status: Not Due       Topic Last Completion Date    Influenza Vaccine 01/30/2018    Lipid Panel 01/30/2018    Hemoglobin A1c 02/09/2018    Low Dose Statin 02/16/2018    Colonoscopy 02/16/2018     Health Maintenance Due   Topic Date Due    Foot Exam  12/21/1961    Eye Exam  12/21/1961    TETANUS VACCINE  12/21/1969    Zoster Vaccine  12/21/2011    Abdominal Aortic Aneurysm Screening  12/21/2016

## 2018-05-15 ENCOUNTER — TELEPHONE (OUTPATIENT)
Dept: FAMILY MEDICINE | Facility: CLINIC | Age: 68
End: 2018-05-15

## 2018-05-16 ENCOUNTER — OFFICE VISIT (OUTPATIENT)
Dept: FAMILY MEDICINE | Facility: CLINIC | Age: 68
End: 2018-05-16
Payer: MEDICARE

## 2018-05-16 ENCOUNTER — OFFICE VISIT (OUTPATIENT)
Dept: OPTOMETRY | Facility: CLINIC | Age: 68
End: 2018-05-16
Payer: MEDICARE

## 2018-05-16 ENCOUNTER — TELEPHONE (OUTPATIENT)
Dept: FAMILY MEDICINE | Facility: CLINIC | Age: 68
End: 2018-05-16

## 2018-05-16 VITALS
BODY MASS INDEX: 33.34 KG/M2 | TEMPERATURE: 98 F | HEIGHT: 71 IN | WEIGHT: 238.13 LBS | SYSTOLIC BLOOD PRESSURE: 158 MMHG | HEART RATE: 92 BPM | DIASTOLIC BLOOD PRESSURE: 90 MMHG

## 2018-05-16 DIAGNOSIS — E11.9 TYPE 2 DIABETES MELLITUS WITHOUT RETINOPATHY: Primary | ICD-10-CM

## 2018-05-16 DIAGNOSIS — H25.13 NUCLEAR SCLEROSIS, BILATERAL: ICD-10-CM

## 2018-05-16 DIAGNOSIS — H52.4 MYOPIA WITH PRESBYOPIA OF BOTH EYES: ICD-10-CM

## 2018-05-16 DIAGNOSIS — I10 ESSENTIAL HYPERTENSION: ICD-10-CM

## 2018-05-16 DIAGNOSIS — R80.9 TYPE 2 DIABETES MELLITUS WITH MICROALBUMINURIA, WITHOUT LONG-TERM CURRENT USE OF INSULIN: Primary | ICD-10-CM

## 2018-05-16 DIAGNOSIS — H52.13 MYOPIA WITH PRESBYOPIA OF BOTH EYES: ICD-10-CM

## 2018-05-16 DIAGNOSIS — Z13.5 GLAUCOMA SCREENING: ICD-10-CM

## 2018-05-16 DIAGNOSIS — E11.29 TYPE 2 DIABETES MELLITUS WITH MICROALBUMINURIA, WITHOUT LONG-TERM CURRENT USE OF INSULIN: Primary | ICD-10-CM

## 2018-05-16 PROCEDURE — 99214 OFFICE O/P EST MOD 30 MIN: CPT | Mod: S$PBB,,, | Performed by: INTERNAL MEDICINE

## 2018-05-16 PROCEDURE — 92004 COMPRE OPH EXAM NEW PT 1/>: CPT | Mod: S$PBB,,, | Performed by: OPTOMETRIST

## 2018-05-16 PROCEDURE — 99999 PR PBB SHADOW E&M-EST. PATIENT-LVL II: CPT | Mod: PBBFAC,,, | Performed by: OPTOMETRIST

## 2018-05-16 PROCEDURE — 99999 PR PBB SHADOW E&M-EST. PATIENT-LVL III: CPT | Mod: PBBFAC,,, | Performed by: INTERNAL MEDICINE

## 2018-05-16 PROCEDURE — 99213 OFFICE O/P EST LOW 20 MIN: CPT | Mod: PBBFAC,PO | Performed by: INTERNAL MEDICINE

## 2018-05-16 PROCEDURE — 99212 OFFICE O/P EST SF 10 MIN: CPT | Mod: PBBFAC,27,PO | Performed by: OPTOMETRIST

## 2018-05-16 PROCEDURE — 92015 DETERMINE REFRACTIVE STATE: CPT | Mod: ,,, | Performed by: OPTOMETRIST

## 2018-05-16 RX ORDER — METFORMIN HYDROCHLORIDE 500 MG/1
1000 TABLET, EXTENDED RELEASE ORAL 2 TIMES DAILY WITH MEALS
Qty: 360 TABLET | Refills: 3 | Status: SHIPPED | OUTPATIENT
Start: 2018-05-16 | End: 2019-02-12 | Stop reason: SINTOL

## 2018-05-16 NOTE — LETTER
May 16, 2018      Justyna Pulido MD  [address]  [place] [address]           [place] [facility]  [address] [facility]  [place] [address]  Phone: [phone]  Fax: [phone]          Patient: Edmundo Nagel Jr.   MR Number: [id number]   YOB: 1950   Date of Visit: 5/16/2018       Dear Dr. Justyna Pulido:    Thank you for referring Edmundo Nagel to me for evaluation. Attached you will find relevant portions of my assessment and plan of care.    If you have questions, please do not hesitate to call me. I look forward to following Edmundo Nagel along with you.    Sincerely,    Richi Domínguez, OD    Enclosure  CC:  No Recipients    If you would like to receive this communication electronically, please contact [email or web address] or [phone] to request more information on [employer] access.    For providers and/or their staff who would like to refer a patient to [facility], please contact us through our one-stop-shop provider referral line, [facility], at [phone].    If you feel you have received this communication in error or would no longer like to receive these types of communications, please e-mail [email or web address]

## 2018-05-16 NOTE — PATIENT INSTRUCTIONS
Controlling High Blood Pressure  High blood pressure (hypertension) is often called the silent killer. This is because many people who have it dont know it. High blood pressure is defined as 140/90 mm Hg or higher. Know your blood pressure and remember to check it regularly. Doing so can save your life. Here are some things you can do to help control your blood pressure.    Choose heart-healthy foods  · Select low-salt, low-fat foods. Limit sodium intake to 2,400 mg per day or the amount suggested by your healthcare provider.  · Limit canned, dried, cured, packaged, and fast foods. These can contain a lot of salt.  · Eat 8 to 10 servings of fruits and vegetables every day.  · Choose lean meats, fish, or chicken.  · Eat whole-grain pasta, brown rice, and beans.  · Eat 2 to 3 servings of low-fat or fat-free dairy products.  · Ask your doctor about the DASH eating plan. This plan helps reduce blood pressure.  · When you go to a restaurant, ask that your meal be prepared with no added salt.  Maintain a healthy weight  · Ask your healthcare provider how many calories to eat a day. Then stick to that number.  · Ask your healthcare provider what weight range is healthiest for you. If you are overweight, a weight loss of only 3% to 5% of your body weight can help lower blood pressure. Generally, a good weight loss goal is to lose 10% of your body weight in a year.  · Limit snacks and sweets.  · Get regular exercise.  Get up and get active  · Choose activities you enjoy. Find ones you can do with friends or family. This includes bicycling, dancing, walking, and jogging.  · Park farther away from building entrances.  · Use stairs instead of the elevator.  · When you can, walk or bike instead of driving.  · [place] leaves, garden, or do household repairs.  · Be active at a moderate to vigorous level of physical activity for at least 40 minutes for a minimum of 3 to 4 days a week.   Manage stress  · Make time to relax and enjoy  life. Find time to laugh.  · Communicate your concerns with your loved ones and your healthcare provider.  · Visit with family and friends, and keep up with hobbies.  Limit alcohol and quit smoking  · Men should have no more than 2 drinks per day.  · Women should have no more than 1 drink per day.  · Talk with your healthcare provider about quitting smoking. Smoking significantly increases your risk for heart disease and stroke. Ask your healthcare provider about community smoking cessation programs and other options.  Medicines  If lifestyle changes arent enough, your healthcare provider may prescribe high blood pressure medicine. Take all medicines as prescribed. If you have any questions about your medicines, ask your healthcare provider before stopping or changing them.   Date Last Reviewed: 4/27/2016  © 3427-9913 [employer]. [address] [place] [address], [place] [address]. All rights reserved. This information is not intended as a substitute for professional medical care. Always follow your healthcare professional's instructions.

## 2018-05-16 NOTE — PROGRESS NOTES
HPI     CRUZ: 2 years ago  Pt states no noticeable va changes. Pt does have glasses but does not wear   them and did not bring them today. Happy w spex  Denies f/f    No gtts     DM     Does not check BS   Hemoglobin A1C       Date                     Value               Ref Range             Status                05/08/2018               8.3 (H)             4.0 - 5.6 %           Final                 02/09/2018               9.0 (H)             4.0 - 5.6 %           Final             ----------      Last edited by Richi Domínguez, OD on 5/16/2018  9:09 AM. (History)        ROS     Positive for: Endocrine (DM)    Negative for: Constitutional, Gastrointestinal, Neurological, Skin,   Genitourinary, Musculoskeletal, HENT, Cardiovascular, Eyes, Respiratory,   Psychiatric, Allergic/Imm, Heme/Lymph    Last edited by Richi Domínguez, OD on 5/16/2018  9:09 AM. (History)        Assessment /Plan     For exam results, see Encounter Report.    Type 2 diabetes mellitus without retinopathy    Nuclear sclerosis, bilateral    Glaucoma screening    Myopia with presbyopia of both eyes      1. Small cats OU--pt wishes new Rx  2. DM- WITHOUT RETINOPATHY.  Advised yearly DFE    PLAN:    rtc 1 yr

## 2018-05-16 NOTE — PROGRESS NOTES
Subjective:        Patient ID: Edmundo Nagel Jr. is a 67 y.o. male.    Chief Complaint: Medication Management    HPI   Edmundo Nagel Jr. presents for follow up of DM, HTN.    Pt has not been able to get a glucometer with supplies yet due to insurance issues.  He is taking Metformin 500mg BID w/o adverse effects including stomach upset, nausea, diarrhea.    Pt checks his bp daily and reports home bps 130-140s/80s.    Review of Systems    Denies pain, burning, numbness in feet.    Objective:        Vitals:    05/16/18 1016   BP: (!) 158/90   Pulse: 92   Temp: 98.1 °F (36.7 °C)     Physical Exam   Constitutional: He appears well-developed and well-nourished. No distress.   Cardiovascular: Normal rate.    Pulmonary/Chest: Effort normal. No respiratory distress.   Musculoskeletal:        Right foot: There is no deformity.        Left foot: There is no deformity.   Feet:   Right Foot:   Protective Sensation: 5 sites tested. 5 sites sensed.   Skin Integrity: Negative for ulcer, blister or skin breakdown.   Left Foot:   Protective Sensation: 5 sites tested. 5 sites sensed.   Skin Integrity: Negative for ulcer, blister or skin breakdown.   Neurological: He is alert.   Vitals reviewed.      Protective sensation of feet tested with 10 gram monofilament.    Assessment:         1. Type 2 diabetes mellitus with microalbuminuria, without long-term current use of insulin    2. Essential hypertension              Plan:         Edmundo was seen today for medication management.    Diagnoses and all orders for this visit:    Type 2 diabetes mellitus with microalbuminuria, without long-term current use of insulin: A1c improving but still over goal <7.  Increase Metformin to 1000mg BID.  Paperwork for glucometer, lancets and strips resubmitted today.  - Recheck A1c in 3 months  - Foot exam normal today  -     metFORMIN (GLUCOPHAGE-XR) 500 MG 24 hr tablet; Take 2 tablets (1,000 mg total) by mouth 2 (two) times daily with  meals.  -     Hemoglobin A1c; Future    Essential hypertension: Home bp reported lower than bp in clinic.  Continue current regimen and bring bp log to next visit.        Follow-up in about 3 months (around 8/16/2018) for Diabetes, high blood pressure.    Patient Instructions       Controlling High Blood Pressure  High blood pressure (hypertension) is often called the silent killer. This is because many people who have it dont know it. High blood pressure is defined as 140/90 mm Hg or higher. Know your blood pressure and remember to check it regularly. Doing so can save your life. Here are some things you can do to help control your blood pressure.    Choose heart-healthy foods  · Select low-salt, low-fat foods. Limit sodium intake to 2,400 mg per day or the amount suggested by your healthcare provider.  · Limit canned, dried, cured, packaged, and fast foods. These can contain a lot of salt.  · Eat 8 to 10 servings of fruits and vegetables every day.  · Choose lean meats, fish, or chicken.  · Eat whole-grain pasta, brown rice, and beans.  · Eat 2 to 3 servings of low-fat or fat-free dairy products.  · Ask your doctor about the DASH eating plan. This plan helps reduce blood pressure.  · When you go to a restaurant, ask that your meal be prepared with no added salt.  Maintain a healthy weight  · Ask your healthcare provider how many calories to eat a day. Then stick to that number.  · Ask your healthcare provider what weight range is healthiest for you. If you are overweight, a weight loss of only 3% to 5% of your body weight can help lower blood pressure. Generally, a good weight loss goal is to lose 10% of your body weight in a year.  · Limit snacks and sweets.  · Get regular exercise.  Get up and get active  · Choose activities you enjoy. Find ones you can do with friends or family. This includes bicycling, dancing, walking, and jogging.  · Park farther away from building entrances.  · Use stairs instead of the  elevator.  · When you can, walk or bike instead of driving.  · [place] leaves, garden, or do household repairs.  · Be active at a moderate to vigorous level of physical activity for at least 40 minutes for a minimum of 3 to 4 days a week.   Manage stress  · Make time to relax and enjoy life. Find time to laugh.  · Communicate your concerns with your loved ones and your healthcare provider.  · Visit with family and friends, and keep up with hobbies.  Limit alcohol and quit smoking  · Men should have no more than 2 drinks per day.  · Women should have no more than 1 drink per day.  · Talk with your healthcare provider about quitting smoking. Smoking significantly increases your risk for heart disease and stroke. Ask your healthcare provider about community smoking cessation programs and other options.  Medicines  If lifestyle changes arent enough, your healthcare provider may prescribe high blood pressure medicine. Take all medicines as prescribed. If you have any questions about your medicines, ask your healthcare provider before stopping or changing them.   Date Last Reviewed: 4/27/2016 © 2000-2017 [employer]. [address] [place] [address], [place] [address]. All rights reserved. This information is not intended as a substitute for professional medical care. Always follow your healthcare professional's instructions.

## 2018-06-05 ENCOUNTER — PATIENT MESSAGE (OUTPATIENT)
Dept: DIABETES | Facility: CLINIC | Age: 68
End: 2018-06-05

## 2018-08-07 ENCOUNTER — LAB VISIT (OUTPATIENT)
Dept: LAB | Facility: HOSPITAL | Age: 68
End: 2018-08-07
Attending: INTERNAL MEDICINE
Payer: MEDICARE

## 2018-08-07 DIAGNOSIS — R80.9 TYPE 2 DIABETES MELLITUS WITH MICROALBUMINURIA, WITHOUT LONG-TERM CURRENT USE OF INSULIN: ICD-10-CM

## 2018-08-07 DIAGNOSIS — E11.29 TYPE 2 DIABETES MELLITUS WITH MICROALBUMINURIA, WITHOUT LONG-TERM CURRENT USE OF INSULIN: ICD-10-CM

## 2018-08-07 LAB
ESTIMATED AVG GLUCOSE: 171 MG/DL
HBA1C MFR BLD HPLC: 7.6 %

## 2018-08-07 PROCEDURE — 83036 HEMOGLOBIN GLYCOSYLATED A1C: CPT

## 2018-08-07 PROCEDURE — 36415 COLL VENOUS BLD VENIPUNCTURE: CPT | Mod: PO

## 2018-08-14 ENCOUNTER — OFFICE VISIT (OUTPATIENT)
Dept: FAMILY MEDICINE | Facility: CLINIC | Age: 68
End: 2018-08-14
Payer: MEDICARE

## 2018-08-14 VITALS
HEIGHT: 71 IN | SYSTOLIC BLOOD PRESSURE: 134 MMHG | WEIGHT: 236.56 LBS | DIASTOLIC BLOOD PRESSURE: 80 MMHG | BODY MASS INDEX: 33.12 KG/M2 | HEART RATE: 97 BPM | TEMPERATURE: 98 F

## 2018-08-14 DIAGNOSIS — E11.29 TYPE 2 DIABETES MELLITUS WITH MICROALBUMINURIA, WITHOUT LONG-TERM CURRENT USE OF INSULIN: Primary | ICD-10-CM

## 2018-08-14 DIAGNOSIS — R80.9 TYPE 2 DIABETES MELLITUS WITH MICROALBUMINURIA, WITHOUT LONG-TERM CURRENT USE OF INSULIN: Primary | ICD-10-CM

## 2018-08-14 DIAGNOSIS — I10 ESSENTIAL HYPERTENSION: ICD-10-CM

## 2018-08-14 PROBLEM — Z12.11 SCREENING FOR COLON CANCER: Status: RESOLVED | Noted: 2018-02-16 | Resolved: 2018-08-14

## 2018-08-14 PROCEDURE — 99213 OFFICE O/P EST LOW 20 MIN: CPT | Mod: PBBFAC,PO | Performed by: INTERNAL MEDICINE

## 2018-08-14 PROCEDURE — 99999 PR PBB SHADOW E&M-EST. PATIENT-LVL III: CPT | Mod: PBBFAC,,, | Performed by: INTERNAL MEDICINE

## 2018-08-14 PROCEDURE — 99213 OFFICE O/P EST LOW 20 MIN: CPT | Mod: S$PBB,,, | Performed by: INTERNAL MEDICINE

## 2018-08-14 NOTE — PROGRESS NOTES
Subjective:        Patient ID: Edmundo Nagel Jr. is a 67 y.o. male.    Chief Complaint: Hypertension (follow up)    HPI   Edmundo Nagel Jr. presents for follow up of HTN and DM.  Pt has been gradually cutting back on sweets and sugar.  He is using sugar substitutes to jessica drinks, eating more fruit when he wants something sweet.  He rides his bike and is active working on his boat.    Review of Systems  as per HPI      Objective:        Vitals:    08/14/18 1045   BP: 134/80   Pulse: 97   Temp: 98.3 °F (36.8 °C)     Physical Exam   Constitutional: He is oriented to person, place, and time. He appears well-developed and well-nourished. No distress.   Cardiovascular: Normal rate.   Pulmonary/Chest: Effort normal. No respiratory distress.   Neurological: He is alert and oriented to person, place, and time.   Skin: Skin is warm and dry.   Vitals reviewed.          Assessment:         1. Type 2 diabetes mellitus with microalbuminuria, without long-term current use of insulin    2. Essential hypertension              Plan:         Edmundo was seen today for hypertension.    Diagnoses and all orders for this visit:    Type 2 diabetes mellitus with microalbuminuria, without long-term current use of insulin: A1c improved.  Discussed adding glyburide vs continuing to make changes to diet.  Pt thinks he still has a lot of room to make adjustments to his diet and would like to try this first.  If A1c >7 at next visit, will start Glyburide.  Continue Metformin 1000mg BID.     Essential hypertension: well controlled; continue Losartan/HCTZ 100/25mg qd        Follow-up in about 5 months (around 1/14/2019) for annual exam or sooner as needed.

## 2019-02-12 ENCOUNTER — OFFICE VISIT (OUTPATIENT)
Dept: FAMILY MEDICINE | Facility: CLINIC | Age: 69
End: 2019-02-12
Payer: MEDICARE

## 2019-02-12 ENCOUNTER — LAB VISIT (OUTPATIENT)
Dept: LAB | Facility: HOSPITAL | Age: 69
End: 2019-02-12
Attending: INTERNAL MEDICINE
Payer: MEDICARE

## 2019-02-12 VITALS
DIASTOLIC BLOOD PRESSURE: 92 MMHG | SYSTOLIC BLOOD PRESSURE: 154 MMHG | TEMPERATURE: 99 F | BODY MASS INDEX: 33.83 KG/M2 | HEIGHT: 71 IN | WEIGHT: 241.63 LBS | RESPIRATION RATE: 20 BRPM

## 2019-02-12 DIAGNOSIS — I10 ESSENTIAL HYPERTENSION: ICD-10-CM

## 2019-02-12 DIAGNOSIS — E11.29 TYPE 2 DIABETES MELLITUS WITH MICROALBUMINURIA, WITHOUT LONG-TERM CURRENT USE OF INSULIN: Primary | ICD-10-CM

## 2019-02-12 DIAGNOSIS — R41.840 DIFFICULTY CONCENTRATING: ICD-10-CM

## 2019-02-12 DIAGNOSIS — E78.5 HYPERLIPIDEMIA, UNSPECIFIED HYPERLIPIDEMIA TYPE: ICD-10-CM

## 2019-02-12 DIAGNOSIS — R80.9 TYPE 2 DIABETES MELLITUS WITH MICROALBUMINURIA, WITHOUT LONG-TERM CURRENT USE OF INSULIN: Primary | ICD-10-CM
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
## 2019-02-12 DIAGNOSIS — R41.3 MEMORY CHANGES: ICD-10-CM

## 2019-02-12 DIAGNOSIS — E11.29 TYPE 2 DIABETES MELLITUS WITH MICROALBUMINURIA, WITHOUT LONG-TERM CURRENT USE OF INSULIN: ICD-10-CM

## 2019-02-12 DIAGNOSIS — R80.9 TYPE 2 DIABETES MELLITUS WITH MICROALBUMINURIA, WITHOUT LONG-TERM CURRENT USE OF INSULIN: ICD-10-CM

## 2019-02-12 LAB
ALBUMIN SERPL BCP-MCNC: 3.8 G/DL
ALP SERPL-CCNC: 98 U/L
ALT SERPL W/O P-5'-P-CCNC: 45 U/L
AMORPH CRY UR QL COMP ASSIST: ABNORMAL
ANION GAP SERPL CALC-SCNC: 8 MMOL/L
AST SERPL-CCNC: 24 U/L
BACTERIA #/AREA URNS AUTO: ABNORMAL /HPF
BASOPHILS # BLD AUTO: 0.03 K/UL
BASOPHILS NFR BLD: 0.5 %
BILIRUB SERPL-MCNC: 0.7 MG/DL
BILIRUB UR QL STRIP: NEGATIVE
BUN SERPL-MCNC: 13 MG/DL
CALCIUM SERPL-MCNC: 9.8 MG/DL
CHLORIDE SERPL-SCNC: 103 MMOL/L
CHOLEST SERPL-MCNC: 213 MG/DL
CHOLEST/HDLC SERPL: 5.5 {RATIO}
CLARITY UR REFRACT.AUTO: ABNORMAL
CO2 SERPL-SCNC: 26 MMOL/L
COLOR UR AUTO: ABNORMAL
CREAT SERPL-MCNC: 0.8 MG/DL
CREAT UR-MCNC: 146 MG/DL
DIFFERENTIAL METHOD: ABNORMAL
EOSINOPHIL # BLD AUTO: 0.2 K/UL
EOSINOPHIL NFR BLD: 2.7 %
ERYTHROCYTE [DISTWIDTH] IN BLOOD BY AUTOMATED COUNT: 12.4 %
EST. GFR  (AFRICAN AMERICAN): >60 ML/MIN/1.73 M^2
EST. GFR  (NON AFRICAN AMERICAN): >60 ML/MIN/1.73 M^2
ESTIMATED AVG GLUCOSE: 197 MG/DL
FOLATE SERPL-MCNC: 7.1 NG/ML
GLUCOSE SERPL-MCNC: 244 MG/DL
GLUCOSE UR QL STRIP: ABNORMAL
HBA1C MFR BLD HPLC: 8.5 %
HCT VFR BLD AUTO: 41.4 %
HDLC SERPL-MCNC: 39 MG/DL
HDLC SERPL: 18.3 %
HGB BLD-MCNC: 14.7 G/DL
HGB UR QL STRIP: NEGATIVE
IMM GRANULOCYTES # BLD AUTO: 0.02 K/UL
IMM GRANULOCYTES NFR BLD AUTO: 0.3 %
KETONES UR QL STRIP: NEGATIVE
LDLC SERPL CALC-MCNC: 132.4 MG/DL
LEUKOCYTE ESTERASE UR QL STRIP: NEGATIVE
LYMPHOCYTES # BLD AUTO: 1.2 K/UL
LYMPHOCYTES NFR BLD: 19.2 %
MCH RBC QN AUTO: 32.5 PG
MCHC RBC AUTO-ENTMCNC: 35.5 G/DL
MCV RBC AUTO: 92 FL
MICROSCOPIC COMMENT: ABNORMAL
MONOCYTES # BLD AUTO: 0.5 K/UL
MONOCYTES NFR BLD: 8.8 %
NEUTROPHILS # BLD AUTO: 4.1 K/UL
NEUTROPHILS NFR BLD: 68.5 %
NITRITE UR QL STRIP: NEGATIVE
NONHDLC SERPL-MCNC: 174 MG/DL
NRBC BLD-RTO: 0 /100 WBC
PH UR STRIP: 5 [PH] (ref 5–8)
PLATELET # BLD AUTO: 167 K/UL
PMV BLD AUTO: 10.3 FL
POTASSIUM SERPL-SCNC: 4.1 MMOL/L
PROT SERPL-MCNC: 7.1 G/DL
PROT UR QL STRIP: NEGATIVE
PROT UR-MCNC: 30 MG/DL
PROT/CREAT UR: 0.21 MG/G{CREAT}
RBC # BLD AUTO: 4.52 M/UL
SODIUM SERPL-SCNC: 137 MMOL/L
SP GR UR STRIP: >=1.03 (ref 1–1.03)
TRIGL SERPL-MCNC: 208 MG/DL
TSH SERPL DL<=0.005 MIU/L-ACNC: 1.98 UIU/ML
URN SPEC COLLECT METH UR: ABNORMAL
VIT B12 SERPL-MCNC: 445 PG/ML
WBC # BLD AUTO: 5.99 K/UL
YEAST UR QL AUTO: ABNORMAL

## 2019-02-12 PROCEDURE — 99999 PR PBB SHADOW E&M-EST. PATIENT-LVL IV: CPT | Mod: PBBFAC,,, | Performed by: INTERNAL MEDICINE

## 2019-02-12 PROCEDURE — 84443 ASSAY THYROID STIM HORMONE: CPT

## 2019-02-12 PROCEDURE — 85025 COMPLETE CBC W/AUTO DIFF WBC: CPT

## 2019-02-12 PROCEDURE — 80053 COMPREHEN METABOLIC PANEL: CPT

## 2019-02-12 PROCEDURE — 86592 SYPHILIS TEST NON-TREP QUAL: CPT

## 2019-02-12 PROCEDURE — 80061 LIPID PANEL: CPT

## 2019-02-12 PROCEDURE — 99214 OFFICE O/P EST MOD 30 MIN: CPT | Mod: PBBFAC,PO | Performed by: INTERNAL MEDICINE

## 2019-02-12 PROCEDURE — 36415 COLL VENOUS BLD VENIPUNCTURE: CPT | Mod: PO

## 2019-02-12 PROCEDURE — 82607 VITAMIN B-12: CPT

## 2019-02-12 PROCEDURE — 84156 ASSAY OF PROTEIN URINE: CPT

## 2019-02-12 PROCEDURE — 99214 OFFICE O/P EST MOD 30 MIN: CPT | Mod: S$PBB,,, | Performed by: INTERNAL MEDICINE

## 2019-02-12 PROCEDURE — 99214 PR OFFICE/OUTPT VISIT, EST, LEVL IV, 30-39 MIN: ICD-10-PCS | Mod: S$PBB,,, | Performed by: INTERNAL MEDICINE

## 2019-02-12 PROCEDURE — 99999 PR PBB SHADOW E&M-EST. PATIENT-LVL IV: ICD-10-PCS | Mod: PBBFAC,,, | Performed by: INTERNAL MEDICINE

## 2019-02-12 PROCEDURE — 82746 ASSAY OF FOLIC ACID SERUM: CPT

## 2019-02-12 PROCEDURE — 83036 HEMOGLOBIN GLYCOSYLATED A1C: CPT

## 2019-02-12 PROCEDURE — 81001 URINALYSIS AUTO W/SCOPE: CPT

## 2019-02-12 NOTE — PROGRESS NOTES
"Subjective:        Patient ID: Edmundo Nagel Jr. is a 68 y.o. male.    Chief Complaint: Diabetes    HPI   Edmundo Nagel Jr. presents for follow up of DM and with c/o SEs from Metformin, difficulty focusing, swelling in feet, poor balance.  Pt reports driving and noticing his peripheral vision was off or poor.  He stopped taking Metformin and his vision is better but the changes in peripheral vision "come and go".  Pt reports he can't focus on any one task.  He will go do something different before he finishes the current task so then he doesn't finish anything he starts.  He used to not have this problem.  He reports tripping over things, endorses he drags his feet but doesn't feel like he has weakness (out of the norm for being old), abnormal sensation (denies numbness, tingling, burning in the feet).  He has fallen because he has tripped over things.  His feet swell.    Pt's mother  unexpectedly 3 months ago.  Pt was living with her prior.  He endorses eating a lot of sugar since then.  He sleeps 3-4 hours/night, no naps.    Review of Systems  as per HPI      Objective:        Vitals:    19 1032   BP: (!) 154/92   Resp: 20   Temp: 98.5 °F (36.9 °C)     Physical Exam   Constitutional: He is oriented to person, place, and time. He appears well-developed and well-nourished. No distress.   HENT:   Head: Normocephalic and atraumatic.   Right Ear: External ear normal.   Left Ear: External ear normal.   Nose: Nose normal.   Mouth/Throat: Oropharynx is clear and moist. No oropharyngeal exudate.   Eyes: Conjunctivae and EOM are normal. Pupils are equal, round, and reactive to light.   Neck: Normal range of motion.   Cardiovascular: Normal rate, regular rhythm and normal heart sounds.   No murmur heard.  Pulmonary/Chest: Effort normal and breath sounds normal. No respiratory distress.   Musculoskeletal: Normal range of motion. He exhibits edema (trace edema in b/l feet).   Neurological: He is alert " and oriented to person, place, and time.   Skin: Skin is warm and dry.   Vitals reviewed.      Exam difficult due to pt's lack of focus, easily distractable.  Talking through cardiopulm exam, wanting to discuss a book he read, etc, despite attempts to redirect.    Assessment:         1. Type 2 diabetes mellitus with microalbuminuria, without long-term current use of insulin    2. Essential hypertension    3. Hyperlipidemia, unspecified hyperlipidemia type    4. Difficulty concentrating    5. Memory changes    6. Tripping over things              Plan:         Edmundo was seen today for diabetes.    Diagnoses and all orders for this visit:    Type 2 diabetes mellitus with microalbuminuria, without long-term current use of insulin: Counseled pt uncontrolled blood sugar can affect all organ systems including cardiopulm, nerve function, cognitive function.  Check A1c today and recommend trying a different medication to treat if he feels the Metformin is affecting his vision.  - f/u with optometry for eye exam  -     Hemoglobin A1c; Future    Essential hypertension: Take Losartan/HCTZ daily.  -     CBC auto differential; Future  -     Comprehensive metabolic panel; Future    Hyperlipidemia, unspecified hyperlipidemia type: Atorvastatin 40mg qd  -     Lipid panel; Future    Difficulty concentrating  Memory changes  Tripping over things: Labs today to r/o treatable causes of memory/cognitaive changes.  Pt's behavior today is not significantly different from his behavior at previous visits.  Also recommend evaluation by neurology.  -     TSH; Future  -     Ambulatory referral to Neurology      Follow up pending lab results.

## 2019-02-13 LAB — RPR SER QL: NORMAL

## 2019-02-14 ENCOUNTER — PATIENT MESSAGE (OUTPATIENT)
Dept: FAMILY MEDICINE | Facility: CLINIC | Age: 69
End: 2019-02-14

## 2019-02-14 DIAGNOSIS — E78.5 HYPERLIPIDEMIA, UNSPECIFIED HYPERLIPIDEMIA TYPE: ICD-10-CM

## 2019-02-14 DIAGNOSIS — R80.9 TYPE 2 DIABETES MELLITUS WITH MICROALBUMINURIA, WITHOUT LONG-TERM CURRENT USE OF INSULIN: Primary | ICD-10-CM

## 2019-02-14 DIAGNOSIS — E11.29 TYPE 2 DIABETES MELLITUS WITH MICROALBUMINURIA, WITHOUT LONG-TERM CURRENT USE OF INSULIN: Primary | ICD-10-CM

## 2019-02-14 RX ORDER — ATORVASTATIN CALCIUM 40 MG/1
40 TABLET, FILM COATED ORAL DAILY
Qty: 30 TABLET | Refills: 5 | Status: SHIPPED | OUTPATIENT
Start: 2019-02-14 | End: 2019-06-18 | Stop reason: SDUPTHER

## 2019-02-14 RX ORDER — GLIMEPIRIDE 4 MG/1
4 TABLET ORAL
Qty: 30 TABLET | Refills: 5 | Status: SHIPPED | OUTPATIENT
Start: 2019-02-14 | End: 2019-03-28 | Stop reason: SINTOL

## 2019-02-27 DIAGNOSIS — I10 ESSENTIAL HYPERTENSION: ICD-10-CM

## 2019-02-28 RX ORDER — LOSARTAN POTASSIUM AND HYDROCHLOROTHIAZIDE 25; 100 MG/1; MG/1
1 TABLET ORAL DAILY
Qty: 90 TABLET | Refills: 1 | Status: SHIPPED | OUTPATIENT
Start: 2019-02-28 | End: 2019-06-18 | Stop reason: SDUPTHER

## 2019-03-27 ENCOUNTER — PATIENT MESSAGE (OUTPATIENT)
Dept: FAMILY MEDICINE | Facility: CLINIC | Age: 69
End: 2019-03-27

## 2019-03-27 DIAGNOSIS — I10 ESSENTIAL HYPERTENSION: ICD-10-CM

## 2019-03-27 DIAGNOSIS — M79.89 LEG SWELLING: ICD-10-CM

## 2019-03-27 DIAGNOSIS — R80.9 TYPE 2 DIABETES MELLITUS WITH MICROALBUMINURIA, WITHOUT LONG-TERM CURRENT USE OF INSULIN: Primary | ICD-10-CM

## 2019-03-27 DIAGNOSIS — E11.29 TYPE 2 DIABETES MELLITUS WITH MICROALBUMINURIA, WITHOUT LONG-TERM CURRENT USE OF INSULIN: Primary | ICD-10-CM

## 2019-04-04 ENCOUNTER — OFFICE VISIT (OUTPATIENT)
Dept: NEUROLOGY | Facility: CLINIC | Age: 69
End: 2019-04-04
Payer: MEDICARE

## 2019-04-04 VITALS
BODY MASS INDEX: 34.48 KG/M2 | HEIGHT: 71 IN | DIASTOLIC BLOOD PRESSURE: 106 MMHG | HEART RATE: 96 BPM | WEIGHT: 246.25 LBS | SYSTOLIC BLOOD PRESSURE: 181 MMHG

## 2019-04-04 DIAGNOSIS — R41.89 OTHER SYMPTOMS AND SIGNS INVOLVING COGNITIVE FUNCTIONS AND AWARENESS: Primary | ICD-10-CM

## 2019-04-04 DIAGNOSIS — E78.5 HYPERLIPIDEMIA, UNSPECIFIED HYPERLIPIDEMIA TYPE: ICD-10-CM

## 2019-04-04 DIAGNOSIS — R41.840 ATTENTION AND CONCENTRATION DEFICIT: ICD-10-CM

## 2019-04-04 DIAGNOSIS — E11.29 TYPE 2 DIABETES MELLITUS WITH MICROALBUMINURIA, WITHOUT LONG-TERM CURRENT USE OF INSULIN: ICD-10-CM

## 2019-04-04 DIAGNOSIS — I10 ESSENTIAL HYPERTENSION: ICD-10-CM

## 2019-04-04 DIAGNOSIS — R80.9 TYPE 2 DIABETES MELLITUS WITH MICROALBUMINURIA, WITHOUT LONG-TERM CURRENT USE OF INSULIN: ICD-10-CM

## 2019-04-04 PROCEDURE — 99213 OFFICE O/P EST LOW 20 MIN: CPT | Mod: PBBFAC | Performed by: PSYCHIATRY & NEUROLOGY

## 2019-04-04 PROCEDURE — 99204 OFFICE O/P NEW MOD 45 MIN: CPT | Mod: S$PBB,,, | Performed by: PSYCHIATRY & NEUROLOGY

## 2019-04-04 PROCEDURE — 99999 PR PBB SHADOW E&M-EST. PATIENT-LVL III: ICD-10-PCS | Mod: PBBFAC,,, | Performed by: PSYCHIATRY & NEUROLOGY

## 2019-04-04 PROCEDURE — 99999 PR PBB SHADOW E&M-EST. PATIENT-LVL III: CPT | Mod: PBBFAC,,, | Performed by: PSYCHIATRY & NEUROLOGY

## 2019-04-04 PROCEDURE — 99204 PR OFFICE/OUTPT VISIT, NEW, LEVL IV, 45-59 MIN: ICD-10-PCS | Mod: S$PBB,,, | Performed by: PSYCHIATRY & NEUROLOGY

## 2019-04-04 NOTE — PROGRESS NOTES
Subjective:       Patient ID: Edmundo Nagel Jr. is a 68 y.o. male.    Chief Complaint:  Memory Loss      History of Present Illness  HPI   This is a 68-year-old male who was referred for evaluation of cognitive difficulties.  He reports that he has had problem with attention span and focusing on a particular task.  In addition, he tends to get distracted very easily and may not finish something he starts if interrupted.  He also has difficulty remembering conversations he may have had and has much more difficulty if he has with a group of people.  This has been going on for a couple of years or so.  He also admits that he has had a long history of her mild depression but was able to function quite adequately without treatment.  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  Since then he has kept busy at home as he has a boat and also likes to do things around the house.  He lives with his brother.  He reports that his mother was living with him however she developed recurrence of cancer and  within 2 months about 3-4 months ago unexpectedly.  Since then he does report that he has been somewhat stress and has not been sleeping well.  The patient is otherwise able to take care of his day-to-day needs including managing her medications, taking care his finances, home is and driving, without any problems.  He came to clinic alone.  He denies any headaches, visual difficulties or hearing impairment.  He denies history of head trauma, blackouts or seizures.    Patient also describes some gait difficulty in that he has become clumsy and occasionally trips over things.  However, he has had no significant falls.  He has had swelling in his feet.  He denies any tingling or numbness in his feet and denies any paresthesias.  He has no back problems.  Past medical history includes history of diabetes and associated renal dysfunction.  Hemoglobin A1c done in 2019 was 8.5.   Other labs included B12, folate, RPR and TSH which were essentially normal.       Review of Systems  Review of Systems   Constitutional: Negative.    HENT: Negative.  Negative for hearing loss.    Eyes: Negative.  Negative for visual disturbance.   Respiratory: Negative.  Negative for shortness of breath.    Cardiovascular: Negative.  Negative for chest pain and palpitations.   Gastrointestinal: Negative.    Endocrine: Negative.    Genitourinary: Negative.    Musculoskeletal: Positive for gait problem. Negative for back pain.   Skin: Negative.    Allergic/Immunologic: Negative.    Neurological: Negative for dizziness, tremors, seizures, syncope, speech difficulty, weakness, numbness and headaches.   Hematological: Negative.    Psychiatric/Behavioral: Positive for decreased concentration. Negative for sleep disturbance.       Objective:      Neurologic Exam     Mental Status   Oriented to person, place, and time.   Registration: recalls 3 of 3 objects. Follows 3 step commands.   Attention: normal. Concentration: normal.   Speech: speech is normal   Level of consciousness: alert  Knowledge: good.   Able to name object. Able to read. Able to repeat. Able to write. Normal comprehension.   Patient is alert, verbal and coherent and in no distress.  He is able to give an adequate recent and past medical history.  Affect is appropriate and mood is even.     Cranial Nerves   Cranial nerves II through XII intact.     CN II   Visual fields full to confrontation.     CN III, IV, VI   Pupils are equal, round, and reactive to light.  Extraocular motions are normal.   Right pupil: Size: 3 mm. Shape: regular. Reactivity: brisk. Consensual response: intact. Accommodation: intact.   Left pupil: Size: 3 mm. Shape: regular. Reactivity: brisk. Consensual response: intact. Accommodation: intact.   CN III: no CN III palsy  CN VI: no CN VI palsy  Nystagmus: none   Diplopia: none  Ophthalmoparesis: none    CN V   Facial sensation intact.      CN VII   Facial expression full, symmetric.     CN VIII   CN VIII normal.     CN IX, X   CN IX normal.     CN XI   CN XI normal.     CN XII   CN XII normal.   Fundus examination:  Sharp disc margins.  Normal vessels on nondilated exam.     Motor Exam   Muscle bulk: normal  Overall muscle tone: normal    Strength   Strength 5/5 throughout. Examination of extremities revealed normal tone and power in both upper and lower extremities with no focal weakness, involuntary movements or atrophy.     Sensory Exam   Light touch normal.   Proprioception normal.   Pinprick normal.     Gait, Coordination, and Reflexes     Gait  Gait: normal    Coordination   Romberg: negative  Finger to nose coordination: normal    Tremor   Resting tremor: absent  Intention tremor: absent  Action tremor: absent    Reflexes   Right brachioradialis: 1+  Left brachioradialis: 1+  Right biceps: 1+  Left biceps: 1+  Right triceps: 1+  Left triceps: 1+  Right patellar: 1+  Left patellar: 1+  Right achilles: 1+  Left achilles: 1+  Right plantar: normal  Left plantar: normal      Physical Exam   Constitutional: He is oriented to person, place, and time. He appears well-developed and well-nourished.   HENT:   Head: Normocephalic and atraumatic.   Eyes: Pupils are equal, round, and reactive to light. EOM are normal.   Neck: Normal range of motion. Neck supple. Carotid bruit is not present.   Cardiovascular: Normal rate and regular rhythm.   Neurological: He is oriented to person, place, and time. He has normal strength. He has a normal Finger-Nose-Finger Test and a normal Romberg Test. Gait normal.   Reflex Scores:       Tricep reflexes are 1+ on the right side and 1+ on the left side.       Bicep reflexes are 1+ on the right side and 1+ on the left side.       Brachioradialis reflexes are 1+ on the right side and 1+ on the left side.       Patellar reflexes are 1+ on the right side and 1+ on the left side.       Achilles reflexes are 1+ on the right  side and 1+ on the left side.  Psychiatric: His speech is normal.   Vitals reviewed.        Assessment:        1. Other symptoms and signs involving cognitive functions and awareness    2. Attention and concentration deficit    3. Type 2 diabetes mellitus with microalbuminuria, without long-term current use of insulin    4. Essential hypertension    5. Hyperlipidemia, unspecified hyperlipidemia type            Plan:       Discussed with patient.  He came to the clinic alone.  His cognitive difficulties may be related to attention span and concentration difficulties due to an underlying affective disorder however the possibility of mild cognitive impairment on a vascular basis needs also to be considered specially given his vascular risk factors specially diabetes and hypertension.  He is advised to be strict with his diet as his diabetes control has not been very good. Will get an MRI scan of the brain, noncontrast and set up neuropsychological testing.  He will follow-up after neuropsychological testing is completed.

## 2019-04-04 NOTE — PATIENT INSTRUCTIONS
Discussed with patient.  He came to the clinic alone.  His cognitive difficulties may be related to attention span and concentration difficulties due to an underlying affective disorder however the possibility of mild cognitive impairment on a vascular basis needs also to be considered specially given his vascular risk factors specially diabetes and hypertension.  He is advised to be strict with his diet as his diabetes control has not been very good. Will get an MRI scan of the brain, noncontrast and set up neuropsychological testing.  He will follow-up after neuropsychological testing is completed.

## 2019-04-05 ENCOUNTER — CLINICAL SUPPORT (OUTPATIENT)
Dept: CARDIOLOGY | Facility: CLINIC | Age: 69
End: 2019-04-05
Attending: INTERNAL MEDICINE
Payer: MEDICARE

## 2019-04-05 ENCOUNTER — PATIENT MESSAGE (OUTPATIENT)
Dept: FAMILY MEDICINE | Facility: CLINIC | Age: 69
End: 2019-04-05

## 2019-04-05 VITALS — WEIGHT: 241 LBS | BODY MASS INDEX: 33.74 KG/M2 | HEIGHT: 71 IN

## 2019-04-05 DIAGNOSIS — M79.89 LEG SWELLING: ICD-10-CM

## 2019-04-05 DIAGNOSIS — I10 ESSENTIAL HYPERTENSION: ICD-10-CM

## 2019-04-05 LAB
ASCENDING AORTA: 3.4 CM
AV INDEX (PROSTH): 0.58
AV MEAN GRADIENT: 10.38 MMHG
AV PEAK GRADIENT: 16.65 MMHG
AV VALVE AREA: 2.59 CM2
AV VELOCITY RATIO: 0.52
BSA FOR ECHO PROCEDURE: 2.34 M2
CV ECHO LV RWT: 0.49 CM
CV STRESS BASE HR: 85 BPM
DIASTOLIC BLOOD PRESSURE: 78 MMHG
DOP CALC AO PEAK VEL: 2.04 M/S
DOP CALC AO VTI: 41.64 CM
DOP CALC LVOT AREA: 4.45 CM2
DOP CALC LVOT DIAMETER: 2.38 CM
DOP CALC LVOT PEAK VEL: 1.05 M/S
DOP CALC LVOT STROKE VOLUME: 107.7 CM3
DOP CALCLVOT PEAK VEL VTI: 24.22 CM
E WAVE DECELERATION TIME: 170.17 MSEC
E/A RATIO: 0.69
E/E' RATIO: 12
ECHO LV POSTERIOR WALL: 1.05 CM (ref 0.6–1.1)
FRACTIONAL SHORTENING: 42 % (ref 28–44)
INTERVENTRICULAR SEPTUM: 1.15 CM (ref 0.6–1.1)
IVRT: 0.09 MSEC
LA MAJOR: 5.88 CM
LA MINOR: 5.46 CM
LA WIDTH: 4.33 CM
LEFT ATRIUM SIZE: 3.93 CM
LEFT ATRIUM VOLUME INDEX: 35.9 ML/M2
LEFT ATRIUM VOLUME: 81.9 CM3
LEFT INTERNAL DIMENSION IN SYSTOLE: 2.49 CM (ref 2.1–4)
LEFT VENTRICLE DIASTOLIC VOLUME INDEX: 36.15 ML/M2
LEFT VENTRICLE DIASTOLIC VOLUME: 82.52 ML
LEFT VENTRICLE MASS INDEX: 71.1 G/M2
LEFT VENTRICLE SYSTOLIC VOLUME INDEX: 9.7 ML/M2
LEFT VENTRICLE SYSTOLIC VOLUME: 22.14 ML
LEFT VENTRICULAR INTERNAL DIMENSION IN DIASTOLE: 4.29 CM (ref 3.5–6)
LEFT VENTRICULAR MASS: 162.35 G
LV LATERAL E/E' RATIO: 12
LV SEPTAL E/E' RATIO: 12
MV PEAK A VEL: 1.22 M/S
MV PEAK E VEL: 0.84 M/S
OHS CV CPX 1 MINUTE RECOVERY HEART RATE: 118 BPM
OHS CV CPX 85 PERCENT MAX PREDICTED HEART RATE MALE: 130
OHS CV CPX ESTIMATED METS: 6
OHS CV CPX MAX PREDICTED HEART RATE: 153
OHS CV CPX PATIENT IS FEMALE: 0
OHS CV CPX PATIENT IS MALE: 1
OHS CV CPX PEAK DIASTOLIC BLOOD PRESSURE: 55 MMHG
OHS CV CPX PEAK HEAR RATE: 137 BPM
OHS CV CPX PEAK RATE PRESSURE PRODUCT: ABNORMAL
OHS CV CPX PEAK SYSTOLIC BLOOD PRESSURE: 165 MMHG
OHS CV CPX PERCENT MAX PREDICTED HEART RATE ACHIEVED: 90
OHS CV CPX PERCENT TARGET HEART RATE ACHIEVED: 32.46
OHS CV CPX RATE PRESSURE PRODUCT PRESENTING: ABNORMAL
OHS CV CPX TARGET HEART RATE: 422
PISA TR MAX VEL: 2.09 M/S
PULM VEIN S/D RATIO: 1.7
PV PEAK D VEL: 0.43 M/S
PV PEAK S VEL: 0.73 M/S
RA MAJOR: 4.92 CM
RA PRESSURE: 3 MMHG
RA WIDTH: 3.31 CM
RIGHT VENTRICULAR END-DIASTOLIC DIMENSION: 3.87 CM
SINUS: 3.6 CM
STJ: 3.08 CM
STRESS ECHO POST EXERCISE DUR MIN: 4 MIN
STRESS ECHO POST EXERCISE DUR SEC: 22
SYSTOLIC BLOOD PRESSURE: 157 MMHG
TDI LATERAL: 0.07
TDI SEPTAL: 0.07
TDI: 0.07
TR MAX PG: 17.47 MMHG
TRICUSPID ANNULAR PLANE SYSTOLIC EXCURSION: 1.95 CM
TV REST PULMONARY ARTERY PRESSURE: 20 MMHG

## 2019-04-05 PROCEDURE — 99999 PR PBB SHADOW E&M-EST. PATIENT-LVL I: CPT | Mod: PBBFAC,,,

## 2019-04-05 PROCEDURE — 99211 OFF/OP EST MAY X REQ PHY/QHP: CPT | Mod: PBBFAC,PO,25

## 2019-04-05 PROCEDURE — 93351 ECHOCARDIOGRAM STRESS TEST WITH COLOR FLOW DOPPLER (CUPID ONLY): ICD-10-PCS | Mod: 26,S$PBB,, | Performed by: INTERNAL MEDICINE

## 2019-04-05 PROCEDURE — 93351 STRESS TTE COMPLETE: CPT | Mod: PBBFAC,PO | Performed by: INTERNAL MEDICINE

## 2019-04-05 PROCEDURE — 99999 PR PBB SHADOW E&M-EST. PATIENT-LVL I: ICD-10-PCS | Mod: PBBFAC,,,

## 2019-04-09 ENCOUNTER — HOSPITAL ENCOUNTER (OUTPATIENT)
Dept: RADIOLOGY | Facility: OTHER | Age: 69
Discharge: HOME | End: 2019-04-09
Attending: PSYCHIATRY & NEUROLOGY
Payer: MEDICARE

## 2019-04-09 DIAGNOSIS — R41.840 ATTENTION AND CONCENTRATION DEFICIT: ICD-10-CM

## 2019-04-09 DIAGNOSIS — E78.5 HYPERLIPIDEMIA, UNSPECIFIED HYPERLIPIDEMIA TYPE: ICD-10-CM

## 2019-04-09 DIAGNOSIS — E11.29 TYPE 2 DIABETES MELLITUS WITH MICROALBUMINURIA, WITHOUT LONG-TERM CURRENT USE OF INSULIN: ICD-10-CM

## 2019-04-09 DIAGNOSIS — R80.9 TYPE 2 DIABETES MELLITUS WITH MICROALBUMINURIA, WITHOUT LONG-TERM CURRENT USE OF INSULIN: ICD-10-CM

## 2019-04-09 DIAGNOSIS — R41.89 OTHER SYMPTOMS AND SIGNS INVOLVING COGNITIVE FUNCTIONS AND AWARENESS: ICD-10-CM

## 2019-04-09 DIAGNOSIS — I10 ESSENTIAL HYPERTENSION: ICD-10-CM

## 2019-04-09 PROCEDURE — 70551 MRI BRAIN WITHOUT CONTRAST: ICD-10-PCS | Mod: 26,,, | Performed by: RADIOLOGY

## 2019-04-09 PROCEDURE — 70551 MRI BRAIN STEM W/O DYE: CPT | Mod: 26,,, | Performed by: RADIOLOGY

## 2019-04-09 PROCEDURE — 70551 MRI BRAIN STEM W/O DYE: CPT | Mod: TC

## 2019-04-24 ENCOUNTER — PATIENT MESSAGE (OUTPATIENT)
Dept: FAMILY MEDICINE | Facility: CLINIC | Age: 69
End: 2019-04-24

## 2019-04-30 ENCOUNTER — TELEPHONE (OUTPATIENT)
Dept: NEUROLOGY | Facility: CLINIC | Age: 69
End: 2019-04-30

## 2019-04-30 NOTE — TELEPHONE ENCOUNTER
Called Pt three times... Sent a [facility] message... Pt is scheduled for 06/28 w/ Dr. Porter at 8:30 appt  Letter mailed

## 2019-04-30 NOTE — TELEPHONE ENCOUNTER
Tried contacting patient to let him know that his e mail has been sent to our  Mandy at [facility] to contact patient to discuss scheduling for NP testing, but got no answer.

## 2019-05-02 ENCOUNTER — TELEPHONE (OUTPATIENT)
Dept: NEUROLOGY | Facility: CLINIC | Age: 69
End: 2019-05-02

## 2019-05-02 ENCOUNTER — PATIENT MESSAGE (OUTPATIENT)
Dept: NEUROLOGY | Facility: CLINIC | Age: 69
End: 2019-05-02

## 2019-05-02 NOTE — TELEPHONE ENCOUNTER
Called Pt this morning to inform him the appt for today has been filled Pt is scheduled to see Dr. Porter in June...I am unable to leave message and a call back number. Talk to Pt or son via [facility] to inform him that today's 12:30 is taken.

## 2019-05-09 ENCOUNTER — PATIENT MESSAGE (OUTPATIENT)
Dept: FAMILY MEDICINE | Facility: CLINIC | Age: 69
End: 2019-05-09

## 2019-05-09 ENCOUNTER — TELEPHONE (OUTPATIENT)
Dept: NEUROLOGY | Facility: CLINIC | Age: 69
End: 2019-05-09

## 2019-05-09 NOTE — TELEPHONE ENCOUNTER
Called Pt for the 5th time  to offer and appt 08/29 Pt declined he states he was told he should be scheduled in five weeks I explained to the patient how our department works he states this is unacceptable and he will send his complaint to the higher ups he will not accept an appt that far  Out he stated  we are holding him back. I advise the patient I can get him scheduled and placed on a wait list or he can seek a testing appt out side of [facility]. Pt stated that's your best advise to me this is a sad situation. Pt continued to yell I politely told the patient I will be hanging up due to his tone and language.

## 2019-05-09 NOTE — TELEPHONE ENCOUNTER
----- Message -----   From: Edmundo Nagel Jr.   Sent: 5/8/2019   3:47 PM   To: [facility] Clinical Support Staff   Subject: RE: Appointment Request                            ----- Message from [facility], System Message sent at 5/8/2019  3:47 PM CDT -----      I am sorry about the telephone problems.  Part of my problem is that I misplace things.   My phone is one of them.  The number is [phone].  I try to check these messages fairly often.  Just note me to call you.      Edmundo   ----- Message -----   From: Joseph Renee   Sent: 5/2/2019  3:09 PM CDT   To: Edmundo Nagel Jr.   Subject: RE: Appointment Request    Mr. Wyatt what is a good number for me to contact to you I have called the number on file 4 times to give you a full understanding of how our department work I am not able to leave you a voicemail. Feel free to call me today at [phone] I am here until 4:30pm          ----- Message -----      From: Edmundo Nagel Jr.      Sent: 5/2/2019 11:36 AM CDT        To: Patient Appointment Schedule Request Mailing List   Subject: RE: Appointment Request      Is this how you address complaints?  My complaint was why does it take 5 weeks to get a memory test?   So, you had an opening on May 2 at 12:30 pm and notified me early  on April 29 which I replied to that afternoon.  I would be there.  This takes place exactly 5 weeks after the initial appointment with the doctor.  So, after waiting 5 weeks now you reschedule me, eight weeks later on June 28.  Not acceptable.  Especially since I am not notified of this change until the am of the appointment time, this day May 2.  Elia,  you cannot get a good review like this.      What happened to the appointment for the original Doctor's request?   I take it the appointment today became available around April 29.  And, we agreed on today May 2. When was the original appointment set for?      This sort of stuff will inhibit  complaints and leave [facility] out of the customer service loophole.      Let's fix this, please.      Edmundo Nagel      cc:  Dr. GILLIAN Haney MD         ----- Message -----   From: Med Assistant Renee   Sent: 5/2/2019  8:31 AM CDT   To: Edmundo Nagel Jr.   Subject: RE: Appointment Request   Sorry this appointment has been taken.       ----- Message -----      From: Edmundo Nagel Jr.      Sent: 4/29/2019  4:37 PM CDT        To: Patient Appointment Schedule Request Mailing List   Subject: RE: Appointment Request      Yes, this Thursday at 12:30 is fine.   Edmundo Nagel      ----- Message -----   From: Joseph Renee   Sent: 4/29/2019  1:39 PM CDT   To: Edmundo Nagel Jr.   Subject: RE: Appointment Request   Good afternoon,   Are you all available on this Thursday at 12:30 with Dr. Porter at [facility]?         ----- Message -----      From: Edmundo Nagel Jr.      Sent: 4/28/2019  8:11 PM CDT        To: Patient Appointment Schedule Request Mailing List   Subject: Appointment Request      Appointment Request From: Edmundo Nagel Jr.      With Provider: Gt Wagner MD [[facility] [address]]      Preferred Date Range: Any      Preferred Times: Any time      Reason for visit: Set up appointment for a memory test      Comments:   Request memory test

## 2019-06-11 ENCOUNTER — TELEPHONE (OUTPATIENT)
Dept: PRIMARY CARE CLINIC | Facility: CLINIC | Age: 69
End: 2019-06-11

## 2019-06-11 ENCOUNTER — PATIENT MESSAGE (OUTPATIENT)
Dept: PRIMARY CARE CLINIC | Facility: CLINIC | Age: 69
End: 2019-06-11

## 2019-06-11 NOTE — TELEPHONE ENCOUNTER
Attempted to call patient in regards to appointment scheduled with Dr. Haney on Thursday. No answer, No VM

## 2019-06-12 NOTE — TELEPHONE ENCOUNTER
"Called patient to inform him that he can no longer see Dr. Haney. Patient is angry and states " it doesn't end here, it starts here". Apologized and offered to schedule him with another provider, patient declines.  "

## 2019-06-14 ENCOUNTER — TELEPHONE (OUTPATIENT)
Dept: PRIMARY CARE CLINIC | Facility: CLINIC | Age: 69
End: 2019-06-14

## 2019-06-14 ENCOUNTER — PATIENT MESSAGE (OUTPATIENT)
Dept: PRIMARY CARE CLINIC | Facility: CLINIC | Age: 69
End: 2019-06-14

## 2019-06-14 NOTE — TELEPHONE ENCOUNTER
"Called patient to inform him that he does not need to quit his medication, as per his [employer] message that Dr. Haney could provide him with 3 months of refills. Patient wants to quit his medication due to side effects from "one of them" and wants to see Dr. Haney to discuss. Patient states that his dismissal letter said he can come see her for an Urgent appointment if within 30 days and his letter is dated 05/31/19. After notifying Jean Claude Verduzco, manager of [facility] that letter was actually dated 05/13/19 not 05/31/19 as patient stated. Called patient, Left message that I was able to see the dismissal letter sent to patient and it was dated 05/13/19 not 05/31/19 advised patient to call [phone] to schedule with another provider in regards to his medication concerns.  "

## 2019-06-18 DIAGNOSIS — R80.9 TYPE 2 DIABETES MELLITUS WITH MICROALBUMINURIA, WITHOUT LONG-TERM CURRENT USE OF INSULIN: ICD-10-CM

## 2019-06-18 DIAGNOSIS — E11.29 TYPE 2 DIABETES MELLITUS WITH MICROALBUMINURIA, WITHOUT LONG-TERM CURRENT USE OF INSULIN: ICD-10-CM

## 2019-06-18 DIAGNOSIS — E78.5 HYPERLIPIDEMIA, UNSPECIFIED HYPERLIPIDEMIA TYPE: ICD-10-CM

## 2019-06-18 DIAGNOSIS — I10 ESSENTIAL HYPERTENSION: ICD-10-CM

## 2019-06-18 RX ORDER — LOSARTAN POTASSIUM AND HYDROCHLOROTHIAZIDE 25; 100 MG/1; MG/1
1 TABLET ORAL DAILY
Qty: 30 TABLET | Refills: 2 | Status: SHIPPED | OUTPATIENT
Start: 2019-06-18 | End: 2019-08-19 | Stop reason: SDUPTHER

## 2019-06-18 RX ORDER — ATORVASTATIN CALCIUM 40 MG/1
40 TABLET, FILM COATED ORAL DAILY
Qty: 30 TABLET | Refills: 2 | Status: SHIPPED | OUTPATIENT
Start: 2019-06-18 | End: 2020-06-17

## 2019-06-28 ENCOUNTER — INITIAL CONSULT (OUTPATIENT)
Dept: NEUROLOGY | Facility: CLINIC | Age: 69
End: 2019-06-28
Payer: MEDICARE

## 2019-06-28 DIAGNOSIS — R41.89 OTHER SYMPTOMS AND SIGNS INVOLVING COGNITIVE FUNCTIONS AND AWARENESS: ICD-10-CM

## 2019-06-28 DIAGNOSIS — F10.21 ALCOHOL USE DISORDER, SEVERE, IN SUSTAINED REMISSION: ICD-10-CM

## 2019-06-28 DIAGNOSIS — F06.70 MILD NEUROCOGNITIVE DISORDER DUE TO MULTIPLE ETIOLOGIES: Primary | ICD-10-CM

## 2019-06-28 PROCEDURE — 96132 PR NEUROPSYCHOLOGIC TEST EVAL SVCS, 1ST HR: ICD-10-PCS | Mod: ,,, | Performed by: CLINICAL NEUROPSYCHOLOGIST

## 2019-06-28 PROCEDURE — 99499 UNLISTED E&M SERVICE: CPT | Mod: S$PBB,,, | Performed by: CLINICAL NEUROPSYCHOLOGIST

## 2019-06-28 PROCEDURE — 96138 PSYCL/NRPSYC TECH 1ST: CPT | Mod: ,,, | Performed by: CLINICAL NEUROPSYCHOLOGIST

## 2019-06-28 PROCEDURE — 90791 PSYCH DIAGNOSTIC EVALUATION: CPT | Mod: ,,, | Performed by: CLINICAL NEUROPSYCHOLOGIST

## 2019-06-28 PROCEDURE — 90791 PR PSYCHIATRIC DIAGNOSTIC EVALUATION: ICD-10-PCS | Mod: ,,, | Performed by: CLINICAL NEUROPSYCHOLOGIST

## 2019-06-28 PROCEDURE — 96132 NRPSYC TST EVAL PHYS/QHP 1ST: CPT | Mod: ,,, | Performed by: CLINICAL NEUROPSYCHOLOGIST

## 2019-06-28 PROCEDURE — 96133 PR NEUROPSYCHOLOGIC TEST EVAL SVCS, EA ADDTL HR: ICD-10-PCS | Mod: ,,, | Performed by: CLINICAL NEUROPSYCHOLOGIST

## 2019-06-28 PROCEDURE — 96139 PR PSYCH/NEUROPSYCH TEST ADMIN/SCORING, BY TECH, 2+ TESTS, EA ADDTL 30 MIN: ICD-10-PCS | Mod: ,,, | Performed by: CLINICAL NEUROPSYCHOLOGIST

## 2019-06-28 PROCEDURE — 96139 PSYCL/NRPSYC TST TECH EA: CPT | Mod: ,,, | Performed by: CLINICAL NEUROPSYCHOLOGIST

## 2019-06-28 PROCEDURE — 96138 PR PSYCH/NEUROPSYCH TEST ADMIN/SCORING, BY TECH, 2+ TESTS, 1ST 30 MIN: ICD-10-PCS | Mod: ,,, | Performed by: CLINICAL NEUROPSYCHOLOGIST

## 2019-06-28 PROCEDURE — 96133 NRPSYC TST EVAL PHYS/QHP EA: CPT | Mod: ,,, | Performed by: CLINICAL NEUROPSYCHOLOGIST

## 2019-06-28 PROCEDURE — 99499 NO LOS: ICD-10-PCS | Mod: S$PBB,,, | Performed by: CLINICAL NEUROPSYCHOLOGIST

## 2019-06-28 NOTE — PROGRESS NOTES
"Outpatient Neuropsychological Evaluation    Referral Information  Name: Edmundo Nagel Jr.  MRN: [id number]  JARA: 2019  : 1950  Age: 68 y.o.  Handedness: Right  Race: White  Gender: male  Referring Provider: Gt Rodgers Md  [address]  [address]  [place] [address]  Referral Reason/Medical Necessity: Cognitive difficulties with neuropsychological evaluation ordered by Neurology to characterize cognitive status, differential diagnosis, and updated treatment recommendations.   Billing: See at the end of the report as billing and coding has changed in 2019.  Consent: The patient expressed an understanding of the purpose of the evaluation and consented to all procedures.    HPI/CURRENT SYMPTOMS  [Onset/Course]: Active problems noted below. Mr. Nagel noticed onset of mild cognitive changes 5-years ago that was not particularly troublesome. He described general trouble with attention/focus and executive functions (getting off track, misplacing things, more disorganization). In the last 2-years, he has noticed more trouble with attention, executive functions, and memory. Course has been mildly worsening over time without any apparent ameliorating/exacerbating factors or "good vs bad" days regarding his cognition.     Cognitive Sxs:  · Attention: Primary Symptom and examples: reduced sustained attention, easily distracted, getting off track easily in conversation and having trouble getting back on track  · Mental Speed: No major changes  · Memory: Secondary Symptom and examples: Feels that he has more trouble remembering details of conversations after the fact, but can remember having the conversation in general.   · Language: No major changes  · Visuospatial/Perceptual: No major changes  · Executive Functioning: Secondary Symptom: Feels much more disorganized, scattered, and less motivated.    Neuropsychiatric Sxs:  · Mood:   · Depression: Longstanding mild depression for which he has " "managed fine without treatment. Endorsed mild stress/bereavement after his mother  in 2019.   · Anxiety: Denied on interview  · Other:   Denied any major irritability and Sister (who came with him) agreed  · Neurovegetative:  · Sleep: Falls asleep fine, but wakes up 2/2 nocturia. Unclear about heavy breathing/snoring.    · Appetite: Good  · Energy: Early afternoon fatiguing.   · Behavioral Concerns:  · Per Sister: No major behavioral issues or unusual behaviors  · Per Patient: He related an unusual series of behaviors in the past year wherein he attempted to improve his erectile dysfunction by hiring a prostitute who he met with regularly, who provided him various drugs (meth, ecstasy, other stimulants), and who he paid a significant amount of money without any improvement. He was able to report that his previous doctors told him about improving vascular health factors, but he didn't think that would work. After he spent a lot of money on the prostitute, he stopped because that was not effective.   · Note: No other report of other unusual behaviors by this patient.   · Delusions/Hallucinations: None  · SI/HI: None    Physical Sxs:  · Motor: Patient describes some gait difficulty in that he has become clumsy and occasionally trips over things.  However, he has had no significant falls.  He has had swelling in his feet 2/2 T2DM, per patient likely causing this.    · Sensory: He denies any tingling or numbness in his feet and denies any paresthesias.  · Pain: None    Current Functioning (I/ADLs):  · ADLs: Independent  · IADLs: Largely independent  · Finances: He has been largely managing his money and knows his monthly income and sources. However, he reported that he "spent everything on the Prostitute" for his erectile dysfunction, but stopped spending money recently. No other unusual financial decisions per him or sister.   · Meds/Health: He knew his most recent A1C (around 9) and added, "I really can't " "control it." He reported no trouble taking his three medications.   · Driving: No trouble  · Household: He handles it (cooking, groceries) for the household without difficulty.     PERTINENT BACKGROUND INFORMATION  Family History   Problem Relation Age of Onset    Cancer Mother     Cancer Father         lung    Amblyopia Neg Hx     Blindness Neg Hx     Cataracts Neg Hx     Glaucoma Neg Hx     Macular degeneration Neg Hx     Retinal detachment Neg Hx     Strabismus Neg Hx      Family Neurologic History: Negative for heritable risk factors  Family Psychiatric History: Negative for heritable risk factors    Developmental/Academic Hx:  Developmental: No gestational or later developmental concerns.  Academic:  · Learning Difficulties: None  · Attention/Behavioral Difficulties: None  · Educational Attainment: HS + BS in [employer] ([employer]) + [employer] ([employer]) + [employer] ([place]    Social/Occupational Hx:  Social:  · Current Relationship/Family Status: Lives with his brother (has likely serious mental illness) +  and she  6-years ago ( 2-years before she ) + adult children (29, 30)   · Primary Source of Support: Minimal it seems currently  · Current Hobbies: Since [facility],he has kept busy at home as he has a boat and also likes to do things around the house.    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    Alcohol use disorder, severe, in sustained remission     Past Medical History:   Diagnosis Date    Abdominal hernia without obstruction and without gangrene 1/30/2018    DM (diabetes mellitus)     Essential hypertension 1/30/2018    Hyperlipidemia 1/30/2018     Past Surgical History:   Procedure Laterality Date    APPENDECTOMY      COLONOSCOPY N/A 2/16/2018    Performed by Kentrell Telles MD at [facility] (4TH FLR)       Neurologic History  · TBI: None  · Seizures: None  · Stroke: None  · Movement Disorder: None    Lab Results   Component Value Date    [id number] 445 02/12/2019     Lab Results   Component Value Date    RPR Non-reactive 02/12/2019     Lab Results   Component Value Date    FOLATE 7.1 02/12/2019     Lab Results   Component Value Date    TSH 1.980 02/12/2019     Lab Results   Component Value Date    HGBA1C 8.5 (H) 02/12/2019     No results found for: HIV1X2, RRM88SSCT    Neurodiagnostics    MRI on 4/9/19  FINDINGS:  Generalized cerebral volume loss with compensatory enlargement ventricles sulci and cisterns without hydrocephalus.  There is no restricted diffusion to suggest acute infarction.  No abnormal parenchymal susceptibility to suggest parenchymal hemorrhage.  There are few punctate scattered foci of T2 FLAIR signal hyperintensities in the supratentorial white matter without corresponding diffusion signal abnormality is are nonspecific and may represent mild chronic ischemic change.  No abnormal intra or extra-axial fluid collection.  The major intracranial T2 flow voids are present.      Impression       Mild age-appropriate cerebral volume loss with few punctate foci of T2 FLAIR signal hyperintensity supratentorial white matter while nonspecific may represent mild chronic ischemic change.    Otherwise unremarkable MRI brain specifically without evidence for acute infarction or hydrocephalus.      Electronically signed by: Pascual Leoms DO  Date: 04/09/2019          Current  "Outpatient Medications:     atorvastatin (LIPITOR) 40 MG tablet, Take 1 tablet (40 mg total) by mouth once daily., Disp: 30 tablet, Rfl: 2    blood sugar diagnostic Strp, To check BG 1 times daily, to use with insurance preferred meter, Disp: 100 each, Rfl: 3    blood-glucose meter kit, To check BG 1 times daily, to use with insurance preferred meter, Disp: 1 each, Rfl: 0    lancets Misc, To check BG 1 times daily, to use with insurance preferred meter, Disp: 100 each, Rfl: 3    losartan-hydrochlorothiazide 100-25 mg (HYZAAR) 100-25 mg per tablet, Take 1 tablet by mouth once daily., Disp: 30 tablet, Rfl: 2    SITagliptin (JANUVIA) 100 MG Tab, Take 1 tablet (100 mg total) by mouth once daily., Disp: 30 tablet, Rfl: 2    Psychiatric Hx:  · Childhood: None  · Adult:  Long-standing mild depression for which he has not needed any treatment  · Substance Use:   · Alcohol Use Disorder  · Teenager to 2006: Chronic heavy etoh abuse. For several years, he was drinking 1/5th of liquor daily. Stopped cold turkey with no detox or treatment  · Note: Sister reported to me that she has seen alcohol in his room in the past year, but couldn't be certain he had relapsed.     Social History     Tobacco Use    Smoking status: Former Smoker    Smokeless tobacco: Never Used    Tobacco comment: started sporadic smoking age 15; 1ppd from [age]   Substance and Sexual Activity    Alcohol use: No     Comment: quit 2006    Drug use: No    Sexual activity: Not on file       MENTAL STATUS AND OBSERVATIONS:  APPEARANCE: Casually dressed and adequate grooming/hygiene.   ALERTNESS/ORIENTATION: Attentive and alert. Fully oriented (x5) to time and place  GAIT: Slow but otherwise unremarkable  MOTOR MOVEMENTS/MANNERISMS: Unremarkable  SPEECH/LANGUAGE: Normal in rate, rhythm, tone, and volume. No significant word finding difficulty noted. Expressive and receptive language was normal.  STATED MOOD/AFFECT: The patients stated mood was "fine." " Affect was congruent with stated mood.   INTERPERSONAL BEHAVIOR: Rapport was quickly and easily established   SUICIDALITY/HOMICIDALITY: Denied  HALLUCINATIONS/DELUSIONS: None evidenced or endorsed  THOUGHT PROCESSES: Thoughts seemed logical and goal-directed.   TEST TAKING BEHAVIOR and VALIDITY: Embedded performance validity measures and observation of effort were suggestive of adequate engagement. The current results, therefore, are likely a valid reflection of the patient's current functioning.     PROCEDURES/TESTS ADMINISTERED:  In addition to performing a review of pertinent medical records, reviewing limits to confidentiality, conducting a clinical interview, and explaining procedures, the following measures were administered:  Mini-Mental State Examination (MMSE; Elisabeth et al., 1993 norms); Wide Range Achievement Test - Fourth Edition (WRAT-IV; Green Form) Reading Test; Wechsler Adult Intelligence Scale-IV (Digit Span, Similarities); Trail Making Test, parts A and B (Vanesa et al., 2004 norms); NAB Naming Test  Category and Letter-cued verbal fluency (animal naming/FAS; Vanesa et al., 2004 norms); Epstein Verbal Learning Test - Revised (Form-1); WMS-IV Logical Memory (Older Adult Battery); Clock Drawing; SDMT; [place] Card Sorting Test-64; Grooved Pegboard Test (Vanesa et al., 2004 norms); Geriatric Depression Scale (GDS-30); [place]-7.  Manual norms were used unless otherwise indicated.  Review data Appendix Below for scores and percentiles.     TEST RESULTS  Pre-Morbid Functioning: Average range    Mental Status: MMSE=  24/30 with errors for recall (0/3), repetition, comprehension (2/3), and drawing.     Attention/Working Memory:  Normal for basic attention, but much more variable as attention/working memory tasks increased in difficulty     Processing or Mental Speed: Normal    Motor Functions: Bilaterally slow, but right handed dexterity and speed were slower than his non-dominant hand by 5 SS.     Language:  Basic expressive and receptive language was normal on observation. Object naming was normal. Verbal reasoning was normal. Fluency measures were both below expectations (Phonemic T=39, Semantic T=35).     Visuospatial/Construction: Normal on most measures    Learning and Memory: Visual learning and memory were average or better for age. Verbal learning and memory measures showed greater variability. When verbal learning placed greater demands on frontal/executive functions, learning and memory was impaired with minimal benefit from recognition cues. When verbal learning placed fewer demands on on frontal/executive functions (e.g., inherently organized for him), scores were better. Recognition cues were also helpful on that task with pre-organized information.     Executive or Frontal-lobe Functions: Verbal reasoning and set-shifting were normal. Phonemic fluency was below average. Additionally, conceptual reasoning and ability to integrate feedback was largely normal aside from increased perseveration that was notable.     Psychiatric or Behavioral Symptoms: Mild anxiety and mild depression reported on self-report measures    OVERALL SUMMARY/DIAGNOSTIC IMPRESSIONS  Review above for onset/course of cognitive symptoms along with functional status and pertinent medical history.     Overall, Mr. Nagel has a Mild Neurocognitive Disorder. Neuropsychological testing showed mild, but detectable problems with complex attention (sustained attention, working memory) and executive functions (organizing verbal information, fluency, mild perseveration). Additionally, aspects of testing showed some mild degree of lateralization (left hemisphere) and localization (temporal/frontal, cerebellar) of cognitive/motor dysfunction that also maps onto the degree of atrophy particularly in the temporal lobe and cerebellum.     Regarding etiology or cause, Mr. Nagel's 50+ year history of very heavy alcohol use (1/5th daily for many  years until 2006) and multiple vascular co-morbidities are likely the two primary causes. He is probably having more noticeable cognitive changes now given his age, his ongoing vascular health issues (HLD, HTN, uncontrolled T2DM) that are further impacting his already vulnerable brain 2/2 his significant alcohol use history. Recommendations noted below to help optimize his cognitive functioning. Additionally, his mild mood trouble and potential sleep issues also need to be addressed.     Referral Dx:  Memory Loss  Attention/Concentration Deficit    Consult Dx:  1. Mild neurocognitive disorder due to multiple etiologies     2. Alcohol use disorder, severe, in sustained remission     3. Other symptoms and signs involving cognitive functions and awareness         YAMIL Porter II, Ph.D., [facility]-  Board Certified Clinical Neuropsychologist  Co-Director, Cognitive Disorders and Brain Health Program  Department of Neurology and Neurosciences  [facility]    RECOMMENDATIONS/TREATMENT PLAN  Follow Up Recommendations:  · Neurology Follow-up: Continued Neurology follow-up as recommended by Mr. Holm neurologist.  · Sleep Medicine Follow-up: Consultation with Sleep Medicine is recommended to assess current sleep quality, rule out a sleep-related disorder, and provide recommendations for treatment.  · Mental Health Follow-up: Will discuss with him a consult for his mood issues at this time. This can also negatively impact his cognition.   · Medical Follow-up/Primary Care: Recommend enrolling him in a care management program for his blood pressure and type 2 diabetes to see if increased monitoring and intervention improves his vascular health.   · Neuropsychology: Neuropsychological reevaluation is recommended in 12-months following implementation of recommendations.    Recommendations for Mr. Nagel and Caregivers/Family:   · Brain Health: Will discuss improving vascular health and brain health. This includes  recommendations for physical activity, healthy diet (e.g., Mediterranean Style Diet), social activity, and mental activity.     · Attention: Remember that inattention and lack of focus are major culprits to forgetting information so be sure and practice paying attention for adequate learning of information. If you rely on passive attention to remembering something (e.g., yeah, uh-huh approach), youll find you cannot recall it later. I recommend the following to improve attention, which may aid in later recall:   · Reduce distractions in the area as much as possible.  · Look at the person as they are speaking to you.   · Paraphrase as they are speaking  · Write down important pieces of information   · Ask them to repeat if you zone out.   · Have them simplify and reduce information that you need to attend to during conversation.   · Have visual cues to remind you if you need to do something later.    · Executive Functioning:  · Dont attempt to multi-task.  Separate tasks so that each can be completed one at a time.  · Consider using a calendar/day planner, as that may be effective to help you plan and stay on track.  Color-coding specific tasks by importance may add additional benefit to your planner.  · Break down large tasks into smaller tasks and write down the steps to completing the task.    · Storing Information: Use the below strategies to help you further enhance how information is stored.  · Rehearse - Immediately after seeing/hearing something, try to recall it.  Wait a few minutes, then check again.  Gradually lengthen the intervals between rehearsals.  · Repetition of learned material is critical to ensure storage of information to be learned. Self-test at home to ensure learning.  · Write down important information to improve your attention and focus and to have something to look back on when you need to recall it.  · Make sure the person doesnt rattle off, but presents in a clear, logical, and  unhurried manner.     · Sleep Hygiene: Poor sleep has a negative effect on cognition. Several strategies have been shown to improve sleep:   · Caffeine intake in the afternoon and evening, as well as stuffing oneself at supper, can decrease the quality of restful sleep throughout the night.   · Bedtime and wake-up times should be consistent every night and morning so the body becomes used to a single routine, even on the weekends.  · Engage in daily physical activity, but not 2-3 hours before bedtime.   · No technology use (television, computer, iPad) 1-2 hours before bed.   · Have a wind down routine (e.g., soft lights in the house, bath before bed, reduced fluid intake, songs, reading, less noise) to promote sleep readiness.   · Visit the www.sleepfoundation.org for more strategies.   · Practice good cognitive hygiene:  · Engage in regular exercise, which increases alertness and arousal and can improve attention and focus.  Consider lower impact exercises, such as yoga or light walking.  · Get a good nights sleep, as this can enhance alertness and cognition.  · Eat healthy foods and balanced meals. It is notable that research indicates certain nutrients may aid in brain function, such as B vitamins (especially B6, B12, and folic acid), antioxidants (such as vitamins C and E, and beta carotene), and Omega-3 fatty acids. Talk with your physician or nutritionist about whats right for you.   · Keep your brain active. Find activities to stay mentally active, such as reading, games (cards, checkers), puzzles (crosswords, Sudoku, jig saw), crafts (models, woodworking), gardening, or participating in activities in the community.  · Stay socially engaged. Continue staying active with your family and friends.      BILLING  Service Description CPT Code Minutes Units   Psychiatric diagnostic evaluation by physician [id number] [phone]   Neurobehavioral status exam by physician [id number]  0   Each additional hour by physician [id number]  0    Test Evaluation Services --  --   Neuropsychological testing evaluation services by physician [id number] [phone]   Each additional hour by physician [phone]   Test Administration and Scoring --  --   Psychological or neuropsychological test administration and scoring by physician [id number]  0   Each additional 30 minutes by physician [id number]  0   Psychological or neuropsychological test administration and scoring by technician [id number] [phone]   Each additional 30 minutes by technician [phone]     DATA APPENDIX  Note: It is important to note that scores/percentiles should only be interpreted by a neuropsychologist. It is common for healthy individuals to have 1-3 isolated low/unusual scores that are not indicative of any significant cognitive dysfunction.  PERFORMANCE VALIDITY  RDS...................................8 / Valid      Percentile Interpretation:        </=3rd......................................Abnormal        4th-9th.....................................Borderline Abnormal        10th-24th...................................Low Average        25th-74th...................................Average        75th-90th...................................High Average        91st-97th...................................Superior        >/=97th.....................................Very Superior        SCREENING OF GENERAL COGNITIVE FUNCTIONING:    MMSE (total score/%ile):     Total Score (max = 30)...............24 / Below Expectations  Orientation to time..................5 / 5  Orientation to place.................5 / 5       ESTIMATED FSIQ and READING LEVEL:      WRAT-4 (Raw/SS/%ile)..................60 / 101 / 53rd        AUDITORY ATTENTION AND WORKING MEMORY    DUDF-DR-Humue Span        Forward raw.........................11 / 63rd      Forward span.........................5 /       Backward raw.........................7 / 37th      Backward span........................4 /       Sequencing raw.......................8 / 50th       Sequencing span......................6 /       Overall (SS/percentile).............10 / 50th            HVLT-R-Trial-1 .............................2    HVLT-R-Trial-3 ............................7          MOTOR AND ORAL PROCESSING SPEED     Trail Making Test (sec. to completion/percentile):        Part A .....................................31 / 46th          Errors..................................0 /             SDMT (total correct/percentile):        Oral Form...................................51 / 44th      Written Form................................40 / 38th      MOTOR FUNCTIONS    Grooved Pegboard (sec. to completion/%ile)        Dominant (Right) Hand.......................106 / 4th      Non-dominant (Left) Hand....................102 / 12th      LANGUAGE FUNCTIONING    WORD PRODUCTIVITY    Verbal Fluency Tests (raw/percentiles):        FAS.........................................29 / 14th      Animals.....................................14 / 7th      CONFRONTATION NAMING    NAB Naming Test (raw/percentile)        Total Spontaneous (max. = 31)...............31/ 69th      VERBAL REASONING    WAIS-IV (scaled score/percentile):        Similarities................................10 / 50th          CONSTRUCTIONAL PRAXIS     Clock Drawing:        Request (max. = 5)...........................4 /       Copy (max. = 5)..............................5 /   Santiago Complex Figure (raw score/percentile):        Copy (max. = 36).............................32 / >16th    NONVERBAL LEARNING AND MEMORY    WMS-IV Visual Reproduction (SS/%ile)        VR-I.........................................14 / 91st      VR-II........................................11 / 63rd      VR-Recognition...............................7 / >75th      VR-Copy......................................42 / 26-50th        VERBAL LEARNING AND MEMORY OF PROSE PASSAGES    WMS-IV (raw score/percentile):        Logical Memory I.............................24 /  16th      Logical Memory II............................14 / 25th      Recognition..................................20 / 51-75th        VERBAL LEARNING AND MEMORY OF A WORDLIST    Epstein Verbal Learning Test - Revised (raw score/percentile):        Form: 1        Trial 1......................................2 /       Trial 2......................................6 /       Trial 3......................................7 /       Total Recall.................................15 / 4th      Delayed Recall...............................2 / 1st      Recognition:            Hits.....................................10 /           False-Positives..........................3 /           Discrimination Index.....................7 / 3rd      EXECUTIVE FUNCTIONING    WCST-64 (raw/%ile):        Categories Completed.........................3 / >16th      Total Errors.................................14 / 50th      Perseverative Errors.........................10 / 24th      Perseverative Responses......................10 / 27th      Nonperseverative Errors.......................4 / 86th      Trials to 1st................................12 / >16th      Failure to Maintain Set......................2 /           Trail Making Test (sec. to completion/%ile):        Part B ......................................83 / 34th          Errors...................................0 /       SELF-REPORTED MOOD  [place]-7...........................................8 / Mild  GDS.............................................17 / Mild to Mod

## 2019-07-01 PROBLEM — F06.70 MILD NEUROCOGNITIVE DISORDER DUE TO MULTIPLE ETIOLOGIES: Status: ACTIVE | Noted: 2019-04-04

## 2019-07-01 PROBLEM — F10.21 ALCOHOL USE DISORDER, SEVERE, IN SUSTAINED REMISSION: Status: ACTIVE | Noted: 2019-07-01

## 2019-07-01 NOTE — ASSESSMENT & PLAN NOTE
Neuropsychological testing showed mild, but detectable problems with complex attention (sustained attention, working memory) and executive functions (organizing verbal information, fluency, mild perseveration). Additionally, aspects of testing showed some mild degree of lateralization (left hemisphere) and localization (temporal/frontal, cerebellar) of cognitive/motor dysfunction that also maps onto the degree of atrophy particularly in the temporal lobe and cerebellum.     Regarding etiology or cause, Mr. Nagel's 50+ year history of very heavy alcohol use (1/5th daily for many years until 2006) and multiple vascular co-morbidities are likely the two primary causes. He is probably having more noticeable cognitive changes now given his age, his ongoing vascular health issues (HLD, HTN, uncontrolled T2DM) that are further impacting his already vulnerable brain 2/2 his significant alcohol use history. Recommendations noted below to help optimize his cognitive functioning. Additionally, his mild mood trouble and potential sleep issues also need to be addressed.

## 2019-07-11 ENCOUNTER — OFFICE VISIT (OUTPATIENT)
Dept: NEUROLOGY | Facility: CLINIC | Age: 69
End: 2019-07-11
Payer: MEDICARE

## 2019-07-11 DIAGNOSIS — F06.70 MILD NEUROCOGNITIVE DISORDER DUE TO MULTIPLE ETIOLOGIES: Primary | ICD-10-CM

## 2019-07-11 PROCEDURE — 99499 NO LOS: ICD-10-PCS | Mod: S$PBB,,, | Performed by: CLINICAL NEUROPSYCHOLOGIST

## 2019-07-11 PROCEDURE — 99499 UNLISTED E&M SERVICE: CPT | Mod: S$PBB,,, | Performed by: CLINICAL NEUROPSYCHOLOGIST

## 2019-07-11 NOTE — PROGRESS NOTES
Neuropsychology Feedback Note    Date of Session: 07/11/2019  Session Content: Results and recommendations were discussed for 40-minutes. Review Neuropsychology Consult dated 6/28/19 for details. No further neuropsychology feedback needed.\

## 2019-07-24 DIAGNOSIS — E11.9 TYPE 2 DIABETES MELLITUS WITHOUT COMPLICATION, WITHOUT LONG-TERM CURRENT USE OF INSULIN: ICD-10-CM

## 2019-07-24 NOTE — TELEPHONE ENCOUNTER
LOV 02/12/19    [facility] message sent to the patient advising that a New PCP needs to be chosen & that future Rx(s) should come from the new provider.

## 2019-07-24 NOTE — LETTER
July 24, 2019    Edmundo Nagel Jr.  [address] [address]             [facility] - Primary care  [address] [facility] [address]  Phone: [phone]  Fax: [phone] Dear Mr. Nagel:      Unfortunately as of June 28th Dr. Haney is no longer with the [facility], since she has relocated out of state. Please choose a new provider schedule your to continue your medical care & refill any medications.    If already scheduled, please disregard.    Below is a list of [facility] Providers who are accepting patient's to be transitioned:    [facility] [place] (Phone: [phone])   DO Kai Martinez MD Richard Imsais, MD Brian Helmstetter, DO Emily Paulk, MD    [facility] (Phone: [phone])  MD Jd Grant MD Natalie Voithofer, MD Kristina Raveendran, MD Julie Doan, MD    [facility] [place] (Phone: [phone])  MD Bharati Antonio MD Mehul Sheth, MD    [facility] [place] (Phone: [phone])  MD Rosalinda Castillo MD Jay Madrecha, MD      [facility] [place] (Phone: [phone])   MD Janice Coates MD (Starting in July)   Salina Blanton MD (Starting in August)     [facility] [address] (Phone: [phone])  MD Marielle Lozano MD Brian Cruz, MD Bennett Hailey, MD Robin Ivester, MD Kathy Jo Carstarphen, MD Kristin Johnson, MD Mary McCormick, MD Le Nguyen, MD Lisa Richards, MD James Stoll, MD Caity Rajput MD    [facility] (Phone: [phone])  MD Rody Tom, NP [facility] [place] (Phone: [phone])  Ashley Hammons, MD Joseph Oswald, MD Ashley Simon, MD [facility] [place] (Phone: [phone])    [facility] [place] (Phone: [phone])        If you have any questions or concerns, please don't hesitate to call.    Sincerely,        Jaleesa Melgar MA

## 2019-08-19 DIAGNOSIS — I10 ESSENTIAL HYPERTENSION: ICD-10-CM

## 2019-08-19 RX ORDER — LOSARTAN POTASSIUM AND HYDROCHLOROTHIAZIDE 25; 100 MG/1; MG/1
1 TABLET ORAL DAILY
Qty: 30 TABLET | Refills: 0 | Status: SHIPPED | OUTPATIENT
Start: 2019-08-19 | End: 2019-08-19 | Stop reason: SDUPTHER

## 2019-08-19 RX ORDER — LOSARTAN POTASSIUM AND HYDROCHLOROTHIAZIDE 25; 100 MG/1; MG/1
TABLET ORAL
Qty: 90 TABLET | Refills: 0 | Status: SHIPPED | OUTPATIENT
Start: 2019-08-19

## 2019-08-19 NOTE — TELEPHONE ENCOUNTER
Needs to establish care with new PCP.  Diabetes is uncontrolled as of his most recent labs in February, A1c 8.5%.

## 2019-09-19 ENCOUNTER — OFFICE VISIT (OUTPATIENT)
Dept: NEUROLOGY | Facility: CLINIC | Age: 69
End: 2019-09-19
Payer: MEDICARE

## 2019-09-19 VITALS
BODY MASS INDEX: 28.98 KG/M2 | SYSTOLIC BLOOD PRESSURE: 159 MMHG | DIASTOLIC BLOOD PRESSURE: 88 MMHG | HEART RATE: 100 BPM | WEIGHT: 207 LBS | HEIGHT: 71 IN

## 2019-09-19 DIAGNOSIS — Z78.9 ALCOHOL USE: ICD-10-CM

## 2019-09-19 DIAGNOSIS — R41.3 MEMORY LOSS: Primary | ICD-10-CM

## 2019-09-19 DIAGNOSIS — F19.90 DRUG USE: ICD-10-CM

## 2019-09-19 DIAGNOSIS — F41.9 ANXIETY: ICD-10-CM

## 2019-09-19 PROCEDURE — 99215 OFFICE O/P EST HI 40 MIN: CPT | Mod: S$GLB,,, | Performed by: NURSE PRACTITIONER

## 2019-09-19 PROCEDURE — 3079F DIAST BP 80-89 MM HG: CPT | Mod: CPTII,S$GLB,, | Performed by: NURSE PRACTITIONER

## 2019-09-19 PROCEDURE — 3077F SYST BP >= 140 MM HG: CPT | Mod: CPTII,S$GLB,, | Performed by: NURSE PRACTITIONER

## 2019-09-19 PROCEDURE — 3079F PR MOST RECENT DIASTOLIC BLOOD PRESSURE 80-89 MM HG: ICD-10-PCS | Mod: CPTII,S$GLB,, | Performed by: NURSE PRACTITIONER

## 2019-09-19 PROCEDURE — 1101F PT FALLS ASSESS-DOCD LE1/YR: CPT | Mod: CPTII,S$GLB,, | Performed by: NURSE PRACTITIONER

## 2019-09-19 PROCEDURE — 1101F PR PT FALLS ASSESS DOC 0-1 FALLS W/OUT INJ PAST YR: ICD-10-PCS | Mod: CPTII,S$GLB,, | Performed by: NURSE PRACTITIONER

## 2019-09-19 PROCEDURE — 99999 PR PBB SHADOW E&M-EST. PATIENT-LVL III: CPT | Mod: PBBFAC,,, | Performed by: NURSE PRACTITIONER

## 2019-09-19 PROCEDURE — 99999 PR PBB SHADOW E&M-EST. PATIENT-LVL III: ICD-10-PCS | Mod: PBBFAC,,, | Performed by: NURSE PRACTITIONER

## 2019-09-19 PROCEDURE — 99215 PR OFFICE/OUTPT VISIT, EST, LEVL V, 40-54 MIN: ICD-10-PCS | Mod: S$GLB,,, | Performed by: NURSE PRACTITIONER

## 2019-09-19 PROCEDURE — 3077F PR MOST RECENT SYSTOLIC BLOOD PRESSURE >= 140 MM HG: ICD-10-PCS | Mod: CPTII,S$GLB,, | Performed by: NURSE PRACTITIONER

## 2019-09-19 RX ORDER — HYDROCODONE BITARTRATE AND ACETAMINOPHEN 5; 325 MG/1; MG/1
TABLET ORAL
Refills: 0 | COMMUNITY
Start: 2019-09-05 | End: 2019-09-19

## 2019-09-19 RX ORDER — CYCLOBENZAPRINE HCL 5 MG
TABLET ORAL
Refills: 0 | COMMUNITY
Start: 2019-08-13

## 2019-09-19 RX ORDER — IBUPROFEN 600 MG/1
TABLET ORAL
Refills: 0 | COMMUNITY
Start: 2019-08-13 | End: 2019-09-19

## 2019-09-19 RX ORDER — CLINDAMYCIN HYDROCHLORIDE 300 MG/1
CAPSULE ORAL
Refills: 0 | COMMUNITY
Start: 2019-09-05 | End: 2019-09-19 | Stop reason: ALTCHOICE

## 2019-09-19 NOTE — PROGRESS NOTES
Name: Edmundo Nagel Jr.  MRN: [id number]   CSN: [id number]      Date: 19      Referring physician:  Aaarefisabel Self  No address on file    Subjective:      Chief Complaint: ADD, hyper    History of Present Illness (HPI):    Edmundo Nagel Jr. is a 68 y.o.  malewith HTN, HLD, DM2  who presents today for an initial evaluation of memory loss and is alone. He was initially seen by Dr. Wagner 19. He was referred for NP eval, which was done by Dr. Porter 19 and revealed Mild neurocognitive disorders due to multiple etiologies. He has had feedback of these results. He is not sure why he had to come to this appointment today, other than Dr. Porter told him it is part of the process.     Offers the following which is very difficult to follow most of the time:  Want to hear a sad story? All the people told him he has starnage behaviors, disorganized room, and workshop. Missing appointments. Convoluted into working all the time, never finishing things. Will order supplies, put to side, come across a year later and remember a job he did not complete but then the supplies are outdate and can't return.    Always been a little eccentric and peculiar but no one knew it because he was always drunk. Stopped drinking in his mid-50s as thought if he stopped, everything would fall into place. This has not happened. He has not worked steady since this time because it turns out everyone likes him better drunk.     He read Dr Porter's note. He is a good old boy and very thorough but it the report was all wrong. He thinks he has ADD.     He thinks part of his problem is anxiety, speratation anxiety. Wife  in her 50s and he could still shoot her for that.     On a boat, lost a bucket, couldn't get it back. (He is tearful telling this story). He says he kept telling himself it was just a bucket. . This was 3 years ago. Nothing was in the bucket. He says he was attached to the bucket. He attaches himself to things  and even people he just meets acting as if they are his best friend and people don't like that. He says everythg that comes in his head and people don't always like that.      But that lady I dated she wasn't happy and started telling me I had to go . Never held it against her.    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 He has to go to [place] to get it renewed. When he was not working, he did not pay to keep it renewed.     He tested all the  druges available to see if they would help but didn't. How come they never worked? Never could get high? I even asked the drug users why they didn't work. No one can tell me. Tries every once in awhile now. He can sit down with a bottle of alcohol and knock himself out. Has a glass of wine on occasion but not drunk for 14 years. Drinks 3 beers and cannot get high. What happened to all the drugs when we were teenages. I was always out there doing that stuff. Tried it all but never tried homosexuality.   Examiner asks about IV drug use, denies. Says never used IV drugs because can't stand needles.  Examiner asks about pernicious sex. Denies. Says he always gets too attached, like his wife and never interested in anyone else.         Worst mistake made recenlty is dating a young woman. Stays up late. Can't keep up with her.     Has a new PCP at [facility]. Always requests females as his mother took care of him and he feels comfortable with women. Never had this happen before. New PCP told to get undressed and then poked penis and testicles. She's young and he had no response but is that right for her to poke?    dtr has ADD.       Review of Systems    Past Medical History: The patient  has a past medical history of Abdominal hernia without obstruction and without gangrene (1/30/2018), DM (diabetes mellitus), Essential hypertension (1/30/2018), and Hyperlipidemia (1/30/2018).    Social History: The patient  reports that he has  "quit smoking. He has never used smokeless tobacco. He reports that he does not drink alcohol or use drugs.    Family History: Their family history includes ADD / ADHD in his daughter; Cancer in his father and mother; Diabetes in his son.    Allergies: Patient has no known allergies.     Meds:   Current Outpatient Medications on File Prior to Visit   Medication Sig Dispense Refill    atorvastatin (LIPITOR) 40 MG tablet Take 1 tablet (40 mg total) by mouth once daily. 30 tablet 2    losartan-hydrochlorothiazide 100-25 mg (HYZAAR) 100-25 mg per tablet TAKE 1 TABLET BY MOUTH EVERY DAY 90 tablet 0    SITagliptin (JANUVIA) 100 MG Tab Take 1 tablet (100 mg total) by mouth once daily. 30 tablet 2    blood sugar diagnostic Strp 1 strip by Misc.(Non-Drug; Combo Route) route once daily. OneTouch Ultra Blue / use with insurance preferred meter 100 each 0    blood-glucose meter kit To check BG 1 times daily, to use with insurance preferred meter 1 each 0    cyclobenzaprine (FLEXERIL) 5 MG tablet TK 1 T PO TID PRF MSP FOR UP TO 5 DAYS  0    lancets [facility] To check BG 1 times daily, to use with insurance preferred meter 100 each 3    [DISCONTINUED] clindamycin (CLEOCIN) 300 MG capsule TK ONE C PO Q 6 H FOR 10 DAYS  0    [DISCONTINUED] HYDROcodone-acetaminophen (NORCO) 5-325 mg per tablet TK 1 T Q 6 H FOR THREE DAYS PRN FOR PAIN  0    [DISCONTINUED] ibuprofen (ADVIL,MOTRIN) 600 MG tablet TK 1 T PO Q 8 H PRF FOR UP TO 10 DAYS  0     No current facility-administered medications on file prior to visit.        Objective:     Physical Exam:    Vitals:    09/19/19 1016   BP: (!) 159/88   BP Location: Left arm   Patient Position: Sitting   Pulse: 100   Weight: 93.9 kg (207 lb 0.2 oz)   Height: 5' 11" (1.803 m)     Body mass index is 28.87 kg/m².    Constitutional  Well-developed, well-nourished, appears stated age   Cardiovascular  Radial pulses 2+ and symmetric, no LE edema bilaterally     ..  Neurological    * Mental status  " MOCA = deferred   - Orientation Oriented to: person, place and situation     - Memory     Not Tested- gives very detailed hx of past events     - Attention/concentration  Easily distracted- difficult to re-direct     - Language  tangential, incessant      - Executive  Impaired     - Other  Disinhibited      ..  * Cranial nerves       - CN II  PERRL, visual fields full to confrontation     - CN III, IV, VI  Extraocular movements full, normal pursuits and saccades     - CN V  Sensation V1 - V3 intact     - CN VII  Face strong and symmetric bilaterally     - CN VIII  Hearing intact bilaterally     - CN IX, X  Palate raises midline and symmetric     - CN XI  SCM and trapezius 5/5 bilaterally     - CN XII  Tongue midline   * Motor  Muscle bulk normal, strength 5/5 throughout   * Coordination  No dysmetria with finger-to-nose   * Gait  See below.       ..  * Specialized movement exam  No hypophonic speech.    No facial masking.   No cogwheel rigidity.     No bradykinesia.   No tremor with rest, posture, kinesis, or intention.    Little fidgety at times, mainly hands, not tremor, not rhythmic.    No other dystonia, chorea, athetosis, myoclonus, or tics.   No motor impersistence.   Normal-based gait.   No shortened stride length.   No abnormal arm swing.                Laboratory Results:  No visits with results within 3 Month(s) from this visit.   Latest known visit with results is:   Clinical Support on 04/05/2019   Component Date Value Ref Range Status    Ascending aorta 04/05/2019 3.40  cm Final    STJ 04/05/2019 3.08  cm Final    AV mean gradient 04/05/2019 10.38  mmHg Final    Ao peak coy 04/05/2019 2.04  m/s Final    Ao VTI 04/05/2019 41.64  cm Final    IVRT 04/05/2019 0.09  msec Final    IVS 04/05/2019 1.15* 0.6 - 1.1 cm Final    LA size 04/05/2019 3.93  cm Final    Left Atrium Major Axis 04/05/2019 5.88  cm Final    Left Atrium Minor Axis 04/05/2019 5.46  cm Final    LVIDD 04/05/2019 4.29  3.5 - 6.0 cm  Final    LVIDS 04/05/2019 2.49  2.1 - 4.0 cm Final    LVOT diameter 04/05/2019 2.38  cm Final    LVOT peak VTI 04/05/2019 24.22  cm Final    PW 04/05/2019 1.05  0.6 - 1.1 cm Final    MV Peak A Dave 04/05/2019 1.22  m/s Final    E wave decelartion time 04/05/2019 170.17  msec Final    MV Peak E Dave 04/05/2019 0.84  m/s Final    PV Peak D Dave 04/05/2019 0.43  m/s Final    PV Peak S Dave 04/05/2019 0.73  m/s Final    RA Major Axis 04/05/2019 4.92  cm Final    RA Width 04/05/2019 3.31  cm Final    RVDD 04/05/2019 3.87  cm Final    Sinus 04/05/2019 3.60  cm Final    TAPSE 04/05/2019 1.95  cm Final    TR Max Dave 04/05/2019 2.09  m/s Final    TDI LATERAL 04/05/2019 0.07   Final    TDI SEPTAL 04/05/2019 0.07   Final    LA WIDTH 04/05/2019 4.33  cm Final    LV Diastolic Volume 04/05/2019 82.52  mL Final    LV Systolic Volume 04/05/2019 22.14  mL Final    LVOT peak dave 04/05/2019 [id number]  m/s Final    LV LATERAL E/E' RATIO 04/05/2019 12.00   Final    LV SEPTAL E/E' RATIO 04/05/2019 12.00   Final    FS 04/05/2019 42  % Final    LA volume 04/05/2019 81.90  cm3 Final    LV mass 04/05/2019 162.35  g Final    Left Ventricle Relative Wall Thick* 04/05/2019 0.49  cm Final    AV valve area 04/05/2019 2.59  cm2 Final    AV Velocity Ratio 04/05/2019 0.52   Final    AV index (prosthetic) 04/05/2019 0.58   Final    E/A ratio 04/05/2019 0.69   Final    Mean e' 04/05/2019 0.07   Final    Pulm vein S/D ratio 04/05/2019 1.70   Final    LVOT area 04/05/2019 4.45  cm2 Final    LVOT stroke volume 04/05/2019 107.70  cm3 Final    AV peak gradient 04/05/2019 16.65  mmHg Final    E/E' ratio 04/05/2019 12.00   Final    Triscuspid Valve Regurgitation Pea* 04/05/2019 17.47  mmHg Final    BSA 04/05/2019 2.34  m2 Final    Systolic blood pressure 04/05/2019 157  mmHg Final    Diastolic blood pressure 04/05/2019 78  mmHg Final    HR at rest 04/05/2019 85  bpm Final    RPP 04/05/2019 13,345   Final    Peak  HR 04/05/2019 137  bpm Final    Peak Systolic BP 04/05/2019 165  mmHg Final    Peak Diatolic BP 04/05/2019 55  mmHg Final    Peak RPP 04/05/2019 22,605   Final    Estimated METs 04/05/2019 6   Final    Max Predicted HR 04/05/2019 153   Final    85% Max Predicted HR 04/05/2019 130   Final    % Max HR Achieved 04/05/2019 90   Final    1 Minute Recovery HR 04/05/2019 118  bpm Final    OHS CV CPX PATIENT IS MALE 04/05/2019 1   Final    OHS CV CPX PATIENT IS FEMALE 04/05/2019 0   Final    Target Heart Rate 04/05/2019 422   Final    % HR Achieved 04/05/2019 32.46   Final    Exercise duration (sec) 04/05/2019 22   Final    Exercise duration (min) 04/05/2019 4  min Final    LV Systolic Volume Index 04/05/2019 9.7  mL/m2 Final    LV Diastolic Volume Index 04/05/2019 36.15  mL/m2 Final    LA Volume Index 04/05/2019 35.9  mL/m2 Final    LV Mass Index 04/05/2019 71.1  g/m2 Final    Right Atrial Pressure (from IVC) 04/05/2019 3  mmHg Final    TV rest pulmonary artery pressure 04/05/2019 20  mmHg Final       Imaging:   Independent review of images:                     Impression       Mild age-appropriate cerebral volume loss with few punctate foci of T2 FLAIR signal hyperintensity supratentorial white matter while nonspecific may represent mild chronic ischemic change.    Otherwise unremarkable MRI brain specifically without evidence for acute infarction or hydrocephalus.      Electronically signed by: Pascual Lemos DO  Date: 04/09/2019       NP eval 7-11-19:  Overall, Mr. Nagel has a Mild Neurocognitive Disorder. Neuropsychological testing showed mild, but detectable problems with complex attention (sustained attention, working memory) and executive functions (organizing verbal information, fluency, mild perseveration). Additionally, aspects of testing showed some mild degree of lateralization (left hemisphere) and localization (temporal/frontal, cerebellar) of cognitive/motor dysfunction that also maps  onto the degree of atrophy particularly in the temporal lobe and cerebellum.      Regarding etiology or cause, Mr. Naegl's 50+ year history of very heavy alcohol use (1/5th daily for many years until 2006) and multiple vascular co-morbidities are likely the two primary causes. He is probably having more noticeable cognitive changes now given his age, his ongoing vascular health issues (HLD, HTN, uncontrolled T2DM) that are further impacting his already vulnerable brain 2/2 his significant alcohol use history. Recommendations noted below to help optimize his cognitive functioning. Additionally, his mild mood trouble and potential sleep issues also need to be addressed.      Referral Dx:  Memory Loss  Attention/Concentration Deficit     Consult Dx:  1. Mild neurocognitive disorder due to multiple etiologies      2. Alcohol use disorder, severe, in sustained remission      3. Other symptoms and signs involving cognitive functions and awareness               Assessment and Plan     Memory loss  -     Vitamin B1; Future; Expected date: 09/19/2019  -     DRUGS OF ABUSE SCREEN, BLOOD; Future; Expected date: 09/19/2019  -     Ethanol; Future; Expected date: 09/19/2019  -     CBC auto differential; Future; Expected date: 09/19/2019  -     Comprehensive metabolic panel; Future; Expected date: 09/19/2019    Anxiety  -     Ambulatory Referral to Psychiatry        Medical Decision Making:    Very difficult to follow as tangential and loquacious. Disinhibited. Frontal atrophy not appreciated on MRI brain.   Complete reversible memory labs. Also want to check alcohol and tox screen today.  Refer to psychiatry for anxiety.   Follow up 6 months for MOCA.   Consider HIV testing.   He says he has new PCP at [facility] that he has seen.       Lachelle Krause, JOSE, NP-C  Division of Movement and Memory Disorders  [facility]  [phone]

## 2019-09-19 NOTE — PATIENT INSTRUCTIONS
Proceed to 2nd floor to have labs.    I have referred you to psychiatry for anxiety.     Follow up 6 months.

## 2019-11-20 RX ORDER — GLIMEPIRIDE 4 MG/1
4 TABLET ORAL
Qty: 30 TABLET | Refills: 0 | OUTPATIENT
Start: 2019-11-20 | End: 2019-12-20

## 2019-11-20 NOTE — TELEPHONE ENCOUNTER
"Attempted to contact patient at mobile number received recording, "number is not reachable" and home number is "not in service".    My portal message sent    Spoke with Kyara at pharmacy to inform prescription was denied, I tried reaching the patient without success to please let patient know he will need an appointment.  Kyara confirmed she will notify patient.        "